# Patient Record
Sex: FEMALE | Race: WHITE | NOT HISPANIC OR LATINO | Employment: FULL TIME | URBAN - METROPOLITAN AREA
[De-identification: names, ages, dates, MRNs, and addresses within clinical notes are randomized per-mention and may not be internally consistent; named-entity substitution may affect disease eponyms.]

---

## 2019-02-20 NOTE — PROGRESS NOTES
SUBJECTIVE:   Laura Guajardo is a 38 year old female who presents to clinic today for the following health issues:      HPI     Patient is here with concerns of blood pressure. She was recently on a cruise and was sick and went to the medical center and blood pressure was 154/103.  When she came back from her cruise, she went to the [facility] clinic 5 days later and blood pressure was 152/94. She didn't think she was anxious at the time but she was still not feeling well.        Concern - Ear problem  Onset: 2/8/2019 saw the doctor on the ship. She was put on Augmentin for 5 days for left ear infection by the cruise doctor. She was put on another 5 days of Augmentin by the [facility].  [facility]    Description:   When she lays down, she can hear pulse/whooshing sound in left ear. She does not have pain in that ear. She was wondering if she has underlying allergies as she has had recurrent sinus pain, pressure and sinus infections. Prior to 1.5 years ago she had not problems with her sinuses. 1.5 years ago she was on vacation in [facility] and there was a hotel that was old being torn down near where they spent most of their time. They were directing the exhaust from the tear-down toward the area where they were spending most of their time. Since then she has had sinus problems and is wondering if that was part of the cause. This past fall/winter she had sinusitis November, January and February.       Intensity: mild    Progression of Symptoms:  improving    Accompanying Signs & Symptoms:  None    Previous history of similar problem:   None    Precipitating factors:   Worsened by:     Alleviating factors:  Improved by:     Therapies Tried and outcome:     Weight - she does not feel motivated to lose weight because she has so much going on right now with her kids, work and home life. She has lost weight in the past only to gain it back which frustrates her. She feels fine overall and her size is not  bothersome other than she knows she should lose weight.    Problem list and histories reviewed & adjusted, as indicated.  Additional history: as documented    Patient Active Problem List   Diagnosis     CARDIOVASCULAR SCREENING; LDL GOAL LESS THAN 160     Morbid obesity (H)     Past Surgical History:   Procedure Laterality Date     TUBAL LIGATION  2006       Social History     Tobacco Use     Smoking status: Former Smoker     Packs/day: 0.80     Types: Cigarettes     Smokeless tobacco: Never Used   Substance Use Topics     Alcohol use: Yes     Comment: occationally     Family History   Problem Relation Age of Onset     Cancer Maternal Aunt      Hypertension Father      Alcohol/Drug Father         alcohol     Heart Disease Father 52        MI     Psychotic Disorder Sister         depression,anxiety     Psychotic Disorder Sister         depression, anxiety     Alcohol/Drug Brother         alcohol and drugs     Psychotic Disorder Brother         depression     Cancer - colorectal Paternal Grandmother      Cancer - colorectal Paternal Grandfather          Current Outpatient Medications   Medication Sig Dispense Refill     fluticasone (FLONASE) 50 MCG/ACT nasal spray SPRAY 2 SPRAYS INTO BOTH NOSTRILS ONCE DAILY  0     ibuprofen (ADVIL,MOTRIN) 200 MG tablet Take 200 mg by mouth every 4 hours as needed.       ranitidine (ZANTAC) 150 MG tablet Take 1 tablet (150 mg) by mouth 2 times daily (Patient not taking: Reported on 2/25/2019) 60 tablet 1     No Known Allergies  BP Readings from Last 3 Encounters:   02/25/19 130/88   02/26/16 122/80   11/10/14 112/74    Wt Readings from Last 3 Encounters:   02/25/19 117.7 kg (259 lb 6.4 oz)   02/26/16 97.9 kg (215 lb 14.4 oz)   11/10/14 88.7 kg (195 lb 8 oz)                  Labs reviewed in EPIC    ROS:  Constitutional, HEENT, cardiovascular, pulmonary, gi and gu systems are negative, except as otherwise noted.    OBJECTIVE:     /88   Pulse 84   Temp 97  F (36.1  C) (Temporal)  "  Resp 16   Ht 1.636 m (5' 4.41\")   Wt 117.7 kg (259 lb 6.4 oz)   BMI 43.96 kg/m    Body mass index is 43.96 kg/m .  GENERAL: obese, alert and no acute distress  EYES: Eyes grossly normal to inspection, PERRL and conjunctivae and sclerae normal  HENT: no tenderness to palpation over maxillary sinuses, ear canals and left TM with clear effusion, nose and mouth without ulcers or lesions  NECK: no adenopathy, no asymmetry, masses, or scars and thyroid normal to palpation  RESP: lungs clear to auscultation - no rales, rhonchi or wheezes  CV: regular rate and rhythm, normal S1 S2, no S3 or S4, no murmur, click or rub  MS: no gross musculoskeletal defects noted, no edema  SKIN: no suspicious lesions or rashes  NEURO: Normal strength and tone, mentation intact and speech normal  PSYCH: mentation appears normal, affect normal/bright    Diagnostic Test Results:  none     ASSESSMENT/PLAN:     1. Acute recurrent maxillary sinusitis  Symptoms started after vacation in [facility] with exposure to air pollution from demolition of an old hotel which was being vented towards the area where they spent most of their time. Patient was thinking about taking allergy medication but I recommended starting with an ENT consult for further evaluation first. She is agreeable to this.   - OTOLARYNGOLOGY REFERRAL    2. Morbid obesity (H)  Discussed need for weight loss. Patient lacks motivation in this area as she has other things that are taking priority. We discussed the effect of weight on overall health and blood pressure over time and recommended making some changes to lifestyle to bring weight down. Patient agrees and will work on this over time as other stressors are settled.     3. Elevated blood pressure reading without diagnosis of hypertension  Normal BP here in clinic. Discussed that elevation in BP could have been due to illness and anxiety she was having. I recommended she have it checked a couple times at the pharmacy over the " next month to ensure it is continuing to be in normal range and follow up if rechecks are elevated.     Greater than 50% of 25 minute visit were spent on counseling or coordination of care regarding recurrent maxillary sinusitis, obesity, elevated BP without diagnosis of hypertension.       KIMBERLY Onofre [facility]

## 2019-02-25 ENCOUNTER — OFFICE VISIT (OUTPATIENT)
Dept: FAMILY MEDICINE | Facility: OTHER | Age: 39
End: 2019-02-25
Payer: COMMERCIAL

## 2019-02-25 VITALS
TEMPERATURE: 97 F | HEART RATE: 84 BPM | RESPIRATION RATE: 16 BRPM | HEIGHT: 64 IN | BODY MASS INDEX: 44.28 KG/M2 | DIASTOLIC BLOOD PRESSURE: 88 MMHG | WEIGHT: 259.4 LBS | SYSTOLIC BLOOD PRESSURE: 130 MMHG

## 2019-02-25 DIAGNOSIS — E66.01 MORBID OBESITY (H): ICD-10-CM

## 2019-02-25 DIAGNOSIS — R03.0 ELEVATED BLOOD PRESSURE READING WITHOUT DIAGNOSIS OF HYPERTENSION: ICD-10-CM

## 2019-02-25 DIAGNOSIS — J01.01 ACUTE RECURRENT MAXILLARY SINUSITIS: Primary | ICD-10-CM

## 2019-02-25 PROCEDURE — 99214 OFFICE O/P EST MOD 30 MIN: CPT | Performed by: STUDENT IN AN ORGANIZED HEALTH CARE EDUCATION/TRAINING PROGRAM

## 2019-02-25 RX ORDER — FLUTICASONE PROPIONATE 50 MCG
SPRAY, SUSPENSION (ML) NASAL
Refills: 0 | COMMUNITY
Start: 2019-02-13 | End: 2019-07-10

## 2019-02-25 ASSESSMENT — MIFFLIN-ST. JEOR: SCORE: 1848.12

## 2019-02-25 ASSESSMENT — PATIENT HEALTH QUESTIONNAIRE - PHQ9: SUM OF ALL RESPONSES TO PHQ QUESTIONS 1-9: 8

## 2019-02-25 ASSESSMENT — PAIN SCALES - GENERAL: PAINLEVEL: NO PAIN (0)

## 2019-02-25 NOTE — PATIENT INSTRUCTIONS
Pseudoephedrine twice daily for 3 days to dry up inner ear fluid.    Ear and nose and throat doctor referral. Call to schedule an appointment with Dr. Pringle.    Stop by the pharmacy to have your blood pressure checked 1-2 times in the next month.    HUBERT BerryC

## 2019-03-27 ENCOUNTER — TELEPHONE (OUTPATIENT)
Dept: FAMILY MEDICINE | Facility: OTHER | Age: 39
End: 2019-03-27

## 2019-03-27 NOTE — TELEPHONE ENCOUNTER
You placed a referral for patient to ENT on 2/25/19.  Patient has not scheduled as of yet.      Please review and forward to team if follow up with the patient is needed.     Thank you!  Michelle/Clinic Referrals Dyad II

## 2019-07-10 ENCOUNTER — NURSE TRIAGE (OUTPATIENT)
Dept: FAMILY MEDICINE | Facility: OTHER | Age: 39
End: 2019-07-10

## 2019-07-10 ENCOUNTER — TELEPHONE (OUTPATIENT)
Dept: FAMILY MEDICINE | Facility: OTHER | Age: 39
End: 2019-07-10

## 2019-07-10 ENCOUNTER — OFFICE VISIT (OUTPATIENT)
Dept: FAMILY MEDICINE | Facility: CLINIC | Age: 39
End: 2019-07-10
Payer: COMMERCIAL

## 2019-07-10 VITALS
SYSTOLIC BLOOD PRESSURE: 122 MMHG | WEIGHT: 238 LBS | TEMPERATURE: 98.3 F | DIASTOLIC BLOOD PRESSURE: 92 MMHG | HEIGHT: 64 IN | HEART RATE: 80 BPM | RESPIRATION RATE: 18 BRPM | OXYGEN SATURATION: 98 % | BODY MASS INDEX: 40.63 KG/M2

## 2019-07-10 DIAGNOSIS — E66.01 MORBID OBESITY (H): ICD-10-CM

## 2019-07-10 DIAGNOSIS — R42 DIZZINESS: Primary | ICD-10-CM

## 2019-07-10 PROCEDURE — 99214 OFFICE O/P EST MOD 30 MIN: CPT | Performed by: NURSE PRACTITIONER

## 2019-07-10 PROCEDURE — 36415 COLL VENOUS BLD VENIPUNCTURE: CPT | Performed by: NURSE PRACTITIONER

## 2019-07-10 PROCEDURE — 80053 COMPREHEN METABOLIC PANEL: CPT | Performed by: NURSE PRACTITIONER

## 2019-07-10 RX ORDER — MECLIZINE HYDROCHLORIDE 25 MG/1
25 TABLET ORAL 3 TIMES DAILY PRN
Qty: 30 TABLET | Refills: 0 | Status: SHIPPED | OUTPATIENT
Start: 2019-07-10 | End: 2020-08-25

## 2019-07-10 ASSESSMENT — MIFFLIN-ST. JEOR: SCORE: 1751.07

## 2019-07-10 ASSESSMENT — PAIN SCALES - GENERAL: PAINLEVEL: NO PAIN (0)

## 2019-07-10 NOTE — TELEPHONE ENCOUNTER
Laura Guajardo is a 38 year old female who calls with dizziness    NURSING ASSESSMENT:  Description:  I spoke with the pt who states on Saturday she was on a boat all day. Sunday she started to feel dizzy. It is worse when she is sitting straight up or walking.   Onset/duration:  Sunday  Precip. factors:  none  Associated symptoms:  No recent illnesses, no fever, no ear pain  Improves/worsens symptoms:  Eating and drinking fine, pushing fluids.    Allergies: No Known Allergies    RECOMMENDED DISPOSITION:  Pt will go to  or [facility], offered appts for tomorrow morning  Will comply with recommendation: Yes  If further questions/concerns or if symptoms do not improve, worsen or new symptoms develop, call your PCP or [facility] as soon as possible.      Guideline used: dizziness  Telephone Triage Protocols for Nurses, Fifth Edition, Sofia Bledsoe RN

## 2019-07-10 NOTE — PROGRESS NOTES
Subjective     Laura Guajardo is a 38 year old female who presents to clinic today for the following health issues:    HPI   Dizziness  Onset: 4 days     Description: Patient was on a boat all day last Saturday. Dizziness started a day after.   Do you feel faint:  no   Does it feel like the surroundings (bed, room) are moving: no   Unsteady/off balance: no   Have you passed out or fallen: no     Intensity: moderate    Progression of Symptoms:  worsening    Accompanying Signs & Symptoms:  Heart palpitations: no   Nausea, vomiting: no   Weakness in arms or legs: no   Fatigue: YES  Vision or speech changes: no   Ringing in ears (Tinnitus): no   Hearing Loss: no     History:   Head trauma/concussion hx: no   Previous similar symptoms: no   Recent bleeding history: no     Precipitating factors:   Worse with activity or head movement: no   Any new medications (BP?): no   Alcohol/drug abuse/withdrawal: no     Alleviating factors:   Does staying in a fixed position give relief:  YES    Therapies Tried and outcome: none           Patient Active Problem List   Diagnosis     CARDIOVASCULAR SCREENING; LDL GOAL LESS THAN 160     Morbid obesity (H)     Past Surgical History:   Procedure Laterality Date     TUBAL LIGATION  2006       Social History     Tobacco Use     Smoking status: Former Smoker     Packs/day: 0.50     Years: 5.00     Pack years: 2.50     Types: Cigarettes     Smokeless tobacco: Never Used   Substance Use Topics     Alcohol use: Yes     Comment: 0-2 per week      Family History   Problem Relation Age of Onset     Cancer Maternal Aunt      Hypertension Father      Alcohol/Drug Father         alcohol     Heart Disease Father 52        MI     Psychotic Disorder Sister         depression,anxiety     Psychotic Disorder Sister         depression, anxiety     Alcohol/Drug Brother         alcohol and drugs     Psychotic Disorder Brother         depression     Cancer - colorectal Paternal Grandmother      Cancer -  "colorectal Paternal Grandfather          Current Outpatient Medications   Medication Sig Dispense Refill     meclizine (ANTIVERT) 25 MG tablet Take 1 tablet (25 mg) by mouth 3 times daily as needed for dizziness 30 tablet 0     ibuprofen (ADVIL,MOTRIN) 200 MG tablet Take 200 mg by mouth every 4 hours as needed.       No Known Allergies  Recent Labs   Lab Test 02/26/16  1539 11/08/13  0922   LDL  --  99   HDL  --  50   TRIG  --  90   ALT 44  --    CR 0.83  --    GFRESTIMATED 78  --    GFRESTBLACK >90   GFR Calc    --    POTASSIUM 3.9  --       BP Readings from Last 3 Encounters:   07/10/19 (!) 122/92   02/25/19 130/88   02/26/16 122/80    Wt Readings from Last 3 Encounters:   07/10/19 108 kg (238 lb)   02/25/19 117.7 kg (259 lb 6.4 oz)   02/26/16 97.9 kg (215 lb 14.4 oz)                    Reviewed and updated as needed this visit by Provider         Review of Systems   ROS COMP: Constitutional, HEENT, cardiovascular, pulmonary, gi and gu systems are negative, except as otherwise noted.      Objective    BP (!) 122/92   Pulse 80   Temp 98.3  F (36.8  C) (Temporal)   Resp 18   Ht 1.636 m (5' 4.41\")   Wt 108 kg (238 lb)   LMP 06/30/2019 (Approximate)   SpO2 98%   Breastfeeding? No   BMI 40.33 kg/m    Body mass index is 40.33 kg/m .  Physical Exam   GENERAL: healthy, alert and no distress  NECK: no adenopathy, no asymmetry, masses, or scars and thyroid normal to palpation  RESP: lungs clear to auscultation - no rales, rhonchi or wheezes  CV: regular rate and rhythm, normal S1 S2, no S3 or S4, no murmur, click or rub, no peripheral edema and peripheral pulses strong  ABDOMEN: soft, nontender, no hepatosplenomegaly, no masses and bowel sounds normal  MS: no gross musculoskeletal defects noted, no edema  SKIN: no suspicious lesions or rashes  NEURO: Normal strength and tone, mentation intact and speech normal  PSYCH: mentation appears normal, affect normal/bright    Diagnostic Test Results:  Labs " "reviewed in Epic  No results found for this or any previous visit (from the past 24 hour(s)).        Assessment & Plan     1. Dizziness  Suspect dizziness is related to possible dehydration or motion sickness.  [place]  Said she doesn't think she drank enough water, had a few alcoholic beverages.  Will check a CMP to ensure electrolytes are not abnormal and treat dizziness symptoms with meclizine.  We discussed with her elevated BP reading in clinic today to monitor as outpatient and follow-up in clinic in 2 weeks.  BP log and instructions to obtain an accurate measurement provided. Counseled for her to go to the ER if dizziness worsens, she develops CP, SOB or any other acute distress.    - Comprehensive metabolic panel  - meclizine (ANTIVERT) 25 MG tablet; Take 1 tablet (25 mg) by mouth 3 times daily as needed for dizziness  Dispense: 30 tablet; Refill: 0    2. Morbid obesity (H)  Patient needs long-term weight loss. She acknowledges she is working on trying to lose weight.  See BMI Plan below.       BMI:   Estimated body mass index is 40.33 kg/m  as calculated from the following:    Height as of this encounter: 1.636 m (5' 4.41\").    Weight as of this encounter: 108 kg (238 lb).   Weight management plan: Discussed healthy diet and exercise guidelines        Patient Instructions   CMP today - checking electrolytes  Check BP - record on log - along with any symptoms you may be having.  Follow-up with me in 2 weeks with your BP readings.  Take meclizine as needed.  Ensure you are drinking plenty of water.  Mix gatorade/half water.     Please call or return to clinic for any questions, concerns, or symptoms that are not improving or becoming worse.        Patient Education     Step-by-Step  Checking Your Blood Pressure    Date Last Reviewed: 4/27/2016 2000-2018 [employer]. [address], [place] [address]. All rights reserved. This information " is not intended as a substitute for professional medical care. Always follow your healthcare professional's instructions.               Return in about 2 weeks (around 7/24/2019) for BP Recheck.    Monica Mckoy, [facility]

## 2019-07-10 NOTE — PATIENT INSTRUCTIONS
CMP today - checking electrolytes  Check BP - record on log - along with any symptoms you may be having.  Follow-up with me in 2 weeks with your BP readings.  Take meclizine as needed.  Ensure you are drinking plenty of water.  Mix gatorade/half water.     Please call or return to clinic for any questions, concerns, or symptoms that are not improving or becoming worse.        Patient Education     Step-by-Step  Checking Your Blood Pressure    Date Last Reviewed: 4/27/2016 2000-2018 [employer]. [address] [place] [address], [place] [address]. All rights reserved. This information is not intended as a substitute for professional medical care. Always follow your healthcare professional's instructions.

## 2019-07-11 LAB
ALBUMIN SERPL-MCNC: 4 G/DL (ref 3.4–5)
ALP SERPL-CCNC: 41 U/L (ref 40–150)
ALT SERPL W P-5'-P-CCNC: 57 U/L (ref 0–50)
ANION GAP SERPL CALCULATED.3IONS-SCNC: 7 MMOL/L (ref 3–14)
AST SERPL W P-5'-P-CCNC: 33 U/L (ref 0–45)
BILIRUB SERPL-MCNC: 0.4 MG/DL (ref 0.2–1.3)
BUN SERPL-MCNC: 10 MG/DL (ref 7–30)
CALCIUM SERPL-MCNC: 8.9 MG/DL (ref 8.5–10.1)
CHLORIDE SERPL-SCNC: 106 MMOL/L (ref 94–109)
CO2 SERPL-SCNC: 25 MMOL/L (ref 20–32)
CREAT SERPL-MCNC: 0.79 MG/DL (ref 0.52–1.04)
GFR SERPL CREATININE-BSD FRML MDRD: >90 ML/MIN/{1.73_M2}
GLUCOSE SERPL-MCNC: 93 MG/DL (ref 70–99)
POTASSIUM SERPL-SCNC: 4.1 MMOL/L (ref 3.4–5.3)
PROT SERPL-MCNC: 7.3 G/DL (ref 6.8–8.8)
SODIUM SERPL-SCNC: 138 MMOL/L (ref 133–144)

## 2019-07-31 ENCOUNTER — HOSPITAL ENCOUNTER (EMERGENCY)
Facility: CLINIC | Age: 39
Discharge: HOME | End: 2019-08-01
Attending: PHYSICIAN ASSISTANT | Admitting: PHYSICIAN ASSISTANT
Payer: COMMERCIAL

## 2019-07-31 ENCOUNTER — NURSE TRIAGE (OUTPATIENT)
Dept: NURSING | Facility: CLINIC | Age: 39
End: 2019-07-31

## 2019-07-31 VITALS
OXYGEN SATURATION: 99 % | TEMPERATURE: 97.1 F | SYSTOLIC BLOOD PRESSURE: 178 MMHG | DIASTOLIC BLOOD PRESSURE: 91 MMHG | HEART RATE: 72 BPM | RESPIRATION RATE: 20 BRPM

## 2019-07-31 DIAGNOSIS — R25.1 SHAKINESS: ICD-10-CM

## 2019-07-31 LAB
ALBUMIN SERPL-MCNC: 3.9 G/DL (ref 3.4–5)
ALP SERPL-CCNC: 43 U/L (ref 40–150)
ALT SERPL W P-5'-P-CCNC: 42 U/L (ref 0–50)
ANION GAP SERPL CALCULATED.3IONS-SCNC: 8 MMOL/L (ref 3–14)
AST SERPL W P-5'-P-CCNC: 22 U/L (ref 0–45)
BASOPHILS # BLD AUTO: 0 10E9/L (ref 0–0.2)
BASOPHILS NFR BLD AUTO: 0.3 %
BILIRUB SERPL-MCNC: 0.3 MG/DL (ref 0.2–1.3)
BUN SERPL-MCNC: 9 MG/DL (ref 7–30)
CALCIUM SERPL-MCNC: 8.8 MG/DL (ref 8.5–10.1)
CHLORIDE SERPL-SCNC: 106 MMOL/L (ref 94–109)
CO2 SERPL-SCNC: 26 MMOL/L (ref 20–32)
CREAT SERPL-MCNC: 0.89 MG/DL (ref 0.52–1.04)
DIFFERENTIAL METHOD BLD: NORMAL
EOSINOPHIL NFR BLD AUTO: 0.4 %
ERYTHROCYTE [DISTWIDTH] IN BLOOD BY AUTOMATED COUNT: 13 % (ref 10–15)
GFR SERPL CREATININE-BSD FRML MDRD: 82 ML/MIN/{1.73_M2}
GLUCOSE SERPL-MCNC: 130 MG/DL (ref 70–99)
HCT VFR BLD AUTO: 40.9 % (ref 35–47)
HGB BLD-MCNC: 13.8 G/DL (ref 11.7–15.7)
IMM GRANULOCYTES # BLD: 0 10E9/L (ref 0–0.4)
IMM GRANULOCYTES NFR BLD: 0.3 %
LYMPHOCYTES # BLD AUTO: 1.8 10E9/L (ref 0.8–5.3)
LYMPHOCYTES NFR BLD AUTO: 25.7 %
MCH RBC QN AUTO: 30.7 PG (ref 26.5–33)
MCHC RBC AUTO-ENTMCNC: 33.7 G/DL (ref 31.5–36.5)
MCV RBC AUTO: 91 FL (ref 78–100)
MONOCYTES # BLD AUTO: 0.4 10E9/L (ref 0–1.3)
MONOCYTES NFR BLD AUTO: 5.6 %
NEUTROPHILS # BLD AUTO: 4.8 10E9/L (ref 1.6–8.3)
NEUTROPHILS NFR BLD AUTO: 67.7 %
NRBC # BLD AUTO: 0 10*3/UL
NRBC BLD AUTO-RTO: 0 /100
PLATELET # BLD AUTO: 317 10E9/L (ref 150–450)
POTASSIUM SERPL-SCNC: 3.7 MMOL/L (ref 3.4–5.3)
PROT SERPL-MCNC: 7.4 G/DL (ref 6.8–8.8)
RBC # BLD AUTO: 4.5 10E12/L (ref 3.8–5.2)
SODIUM SERPL-SCNC: 140 MMOL/L (ref 133–144)
TROPONIN I SERPL-MCNC: <0.015 UG/L (ref 0–0.04)
TSH SERPL DL<=0.005 MIU/L-ACNC: 2.1 MU/L (ref 0.4–4)
WBC # BLD AUTO: 7 10E9/L (ref 4–11)

## 2019-07-31 PROCEDURE — 85025 COMPLETE CBC W/AUTO DIFF WBC: CPT | Performed by: PHYSICIAN ASSISTANT

## 2019-07-31 PROCEDURE — 93010 ELECTROCARDIOGRAM REPORT: CPT | Mod: Z6 | Performed by: PHYSICIAN ASSISTANT

## 2019-07-31 PROCEDURE — 84484 ASSAY OF TROPONIN QUANT: CPT | Performed by: PHYSICIAN ASSISTANT

## 2019-07-31 PROCEDURE — 93005 ELECTROCARDIOGRAM TRACING: CPT

## 2019-07-31 PROCEDURE — 99284 EMERGENCY DEPT VISIT MOD MDM: CPT | Mod: 25 | Performed by: PHYSICIAN ASSISTANT

## 2019-07-31 PROCEDURE — 99284 EMERGENCY DEPT VISIT MOD MDM: CPT | Mod: 25

## 2019-07-31 PROCEDURE — 80053 COMPREHEN METABOLIC PANEL: CPT | Performed by: PHYSICIAN ASSISTANT

## 2019-07-31 PROCEDURE — 84443 ASSAY THYROID STIM HORMONE: CPT | Performed by: PHYSICIAN ASSISTANT

## 2019-07-31 NOTE — ED AVS SNAPSHOT
[facility] Emergency Department  [address] [place] [address]  Phone:  [phone]  Fax:  [phone]                                    Laura Guajardo   MRN: [id number]    Department:  [facility] Emergency Department   Date of Visit:  7/31/2019           After Visit Summary Signature Page    I have received my discharge instructions, and my questions have been answered. I have discussed any challenges I see with this plan with the nurse or doctor.    ..........................................................................................................................................  Patient/Patient Representative Signature      ..........................................................................................................................................  Patient Representative Print Name and Relationship to Patient    ..................................................               ................................................  Date                                   Time    ..........................................................................................................................................  Reviewed by Signature/Title    ...................................................              ..............................................  Date                                               Time          22EPIC Rev 08/18

## 2019-08-01 NOTE — TELEPHONE ENCOUNTER
This evening patient had a sudden sensation of chest tightness and back discomfort, and dry mouth. Is still feeling anxious and her blood pressure is higher than usual at 141/98.  Has never felt this before. I advised 911, but she is thinking of having her  drive her the the ED.     Ling De La Torre RN, [facility]       Reason for Disposition    Patient sounds very upset or troubled to the triager    [1] Chest pain lasts > 5 minutes AND [2] age > 30 AND [3] at least one cardiac risk factor (i.e., hypertension, diabetes, obesity, smoker or strong family history of heart disease)    Additional Information    Negative: Severe difficulty breathing (e.g., struggling for each breath, speaks in single words)    Negative: Bluish (or gray) lips or face now    Negative: Difficult to awaken or acting confused (e.g., disoriented, slurred speech)    Negative: Hysterical or combative behavior    Negative: Sounds like a life-threatening emergency to the triager    Negative: Chest pain    Negative: Palpitations, skipped heart beat, or rapid heart beat    Negative: Cough is main symptom    Negative: Suicide thoughts, threats, attempts, or questions    Negative: Depression is main problem or symptom (e.g., feelings of sadness or hopelessness)    Negative: [1] Difficulty breathing AND [2] persists > 10 minutes AND [3] not relieved by reassurance provided by triager    Negative: [1] Lightheadedness or dizziness AND [2] persists > 10 minutes AND [3] not relieved by reassurance provided by triager    Negative: [1] Known or suspected alcohol or drug abuse AND [2] feeling very shaky (i.e., visible tremors of hands)    Negative: Patient sounds very sick or weak to the triager    Negative: Symptoms interfere with work or school    Negative: Requesting to talk to a counselor (e.g., mental health worker, psychiatrist)    Negative: Severe difficulty breathing (e.g., struggling for each breath, speaks in single words)    Negative:  Difficult to awaken or acting confused (e.g., disoriented, slurred speech)    Negative: Shock suspected (e.g., cold/pale/clammy skin, too weak to stand, low BP, rapid pulse)    Negative: [1] Chest pain lasts > 5 minutes AND [2] history of heart disease  (i.e., heart attack, bypass surgery, angina, angioplasty, CHF; not just a heart murmur)    Negative: [1] Chest pain lasts > 5 minutes AND [2] described as crushing, pressure-like, or heavy    Negative: [1] Chest pain lasts > 5 minutes AND [2] age > 50    Protocols used: ANXIETY AND PANIC ATTACK-A-AH, CHEST PAIN-A-AH

## 2019-08-01 NOTE — ED TRIAGE NOTES
Pt tacking joshua vision otc for weight loss. Pt thinks it may be causing panic attacks. Been on it since April. Takes it bid.

## 2019-08-01 NOTE — ED NOTES
Pt presents in a panic mode. Quite anxious, breathing quickly. Pt thinks her otc medication may be causing a panic attack. Hx of anxiety,

## 2019-08-01 NOTE — ED PROVIDER NOTES
"  History     Chief Complaint   Patient presents with     Panic Attack     HPI  Laura Guajardo is a 38 year old female who presents for evaluation of symptoms starting this evening where she was standing at a friend's house and suddenly \"felt an adrenaline feeling throughout my body \".  She states that she developed some tightness in her chest which she would not really report as a pain but more as a \"pressure \".  It was better if she pushed on her chest.  She felt very shaky and her mouth got instantly dry.  She felt an overwhelming sense of anxiety.  She states, \"it was an odd feeling through my whole body \".  She denies any significant chest pain at this time.  She had a similar sensation go through her body 3 days ago as well.  She reports taking a TruVision weight loss supplement for the past 3 months which has helped her lose over 30 pounds.  She continues to eat well.  Does not take any stimulants.  Denies any street drug use.  No other over-the-counter products.  She denies any current palpitations, dyspnea, abdominal pain, lower extremity edema, lower extremity pain, fevers, chills, nausea, vomiting, or diaphoresis.  She is concerned given her father having an MI at 52 years old and maternal grandfather with an MI.  She is a non-smoker.  Nondiabetic.  No hyperlipidemia or hypertension.        Allergies:  No Known Allergies    Problem List:    Patient Active Problem List    Diagnosis Date Noted     Morbid obesity (H) 02/25/2019     Priority: Medium     CARDIOVASCULAR SCREENING; LDL GOAL LESS THAN 160 11/10/2014     Priority: Medium        Past Medical History:    No past medical history on file.    Past Surgical History:    Past Surgical History:   Procedure Laterality Date     TUBAL LIGATION  2006       Family History:    Family History   Problem Relation Age of Onset     Cancer Maternal Aunt      Hypertension Father      Alcohol/Drug Father         alcohol     Heart Disease Father 52        MI     " Psychotic Disorder Sister         depression,anxiety     Psychotic Disorder Sister         depression, anxiety     Alcohol/Drug Brother         alcohol and drugs     Psychotic Disorder Brother         depression     Cancer - colorectal Paternal Grandmother      Cancer - colorectal Paternal Grandfather        Social History:  Marital Status:   [2]  Social History     Tobacco Use     Smoking status: Former Smoker     Packs/day: 0.50     Years: 5.00     Pack years: 2.50     Types: Cigarettes     Smokeless tobacco: Never Used   Substance Use Topics     Alcohol use: Yes     Comment: 0-2 per week      Drug use: No        Medications:      ibuprofen (ADVIL,MOTRIN) 200 MG tablet   meclizine (ANTIVERT) 25 MG tablet         Review of Systems   All other systems reviewed and are negative.      Physical Exam   BP: (!) 178/91  Pulse: 72  Temp: 97.1  F (36.2  C)  Resp: 20  SpO2: 99 %      Physical Exam   Constitutional: She is oriented to person, place, and time. No distress.   HENT:   Head: Normocephalic and atraumatic.   Right Ear: External ear normal.   Left Ear: External ear normal.   Nose: Nose normal.   Mouth/Throat: Oropharynx is clear and moist. No oropharyngeal exudate.   Eyes: Pupils are equal, round, and reactive to light. Conjunctivae and EOM are normal. Right eye exhibits no discharge. Left eye exhibits no discharge. No scleral icterus.   Neck: Normal range of motion. Neck supple. No thyromegaly present.   Cardiovascular: Normal rate, regular rhythm and normal heart sounds.   No murmur heard.  Pulmonary/Chest: Effort normal and breath sounds normal. No respiratory distress. She has no wheezes. She has no rales. She exhibits no tenderness.   Abdominal: Soft. Bowel sounds are normal. She exhibits no distension and no mass. There is no tenderness. There is no rebound and no guarding.   Musculoskeletal: Normal range of motion. She exhibits no edema, tenderness or deformity.   Lymphadenopathy:     She has no  cervical adenopathy.   Neurological: She is alert and oriented to person, place, and time. She displays normal reflexes. No cranial nerve deficit or sensory deficit. She exhibits normal muscle tone. Coordination normal.   Skin: Skin is warm and dry. Capillary refill takes less than 2 seconds. No rash noted. She is not diaphoretic. No erythema.   Psychiatric: She has a normal mood and affect. Her behavior is normal. Thought content normal.   Nursing note and vitals reviewed.      ED Course        Procedures               EKG Interpretation:      Interpreted by Lincoln Copeland  Time reviewed: immediately after it was done.  Symptoms at time of EKG: none.   Rhythm: normal sinus   Rate: normal  Axis: normal  Ectopy: none  Conduction: normal  ST Segments/ T Waves: No ST-T wave changes  Q Waves: none  Comparison to prior: No old EKG available    Clinical Impression: normal EKG          Critical Care time:  none               Results for orders placed or performed during the hospital encounter of 07/31/19 (from the past 24 hour(s))   CBC with platelets differential   Result Value Ref Range    WBC 7.0 4.0 - 11.0 10e9/L    RBC Count 4.50 3.8 - 5.2 10e12/L    Hemoglobin 13.8 11.7 - 15.7 g/dL    Hematocrit 40.9 35.0 - 47.0 %    MCV 91 78 - 100 fl    MCH 30.7 26.5 - 33.0 pg    MCHC 33.7 31.5 - 36.5 g/dL    RDW 13.0 10.0 - 15.0 %    Platelet Count 317 150 - 450 10e9/L    Diff Method Automated Method     % Neutrophils 67.7 %    % Lymphocytes 25.7 %    % Monocytes 5.6 %    % Eosinophils 0.4 %    % Basophils 0.3 %    % Immature Granulocytes 0.3 %    Nucleated RBCs 0 0 /100    Absolute Neutrophil 4.8 1.6 - 8.3 10e9/L    Absolute Lymphocytes 1.8 0.8 - 5.3 10e9/L    Absolute Monocytes 0.4 0.0 - 1.3 10e9/L    Absolute Basophils 0.0 0.0 - 0.2 10e9/L    Abs Immature Granulocytes 0.0 0 - 0.4 10e9/L    Absolute Nucleated RBC 0.0    Comprehensive metabolic panel   Result Value Ref Range    Sodium 140 133 - 144 mmol/L    Potassium 3.7  "3.4 - 5.3 mmol/L    Chloride 106 94 - 109 mmol/L    Carbon Dioxide 26 20 - 32 mmol/L    Anion Gap 8 3 - 14 mmol/L    Glucose 130 (H) 70 - 99 mg/dL    Urea Nitrogen 9 7 - 30 mg/dL    Creatinine 0.89 0.52 - 1.04 mg/dL    GFR Estimate 82 >60 mL/min/[1.73_m2]    GFR Estimate If Black >90 >60 mL/min/[1.73_m2]    Calcium 8.8 8.5 - 10.1 mg/dL    Bilirubin Total 0.3 0.2 - 1.3 mg/dL    Albumin 3.9 3.4 - 5.0 g/dL    Protein Total 7.4 6.8 - 8.8 g/dL    Alkaline Phosphatase 43 40 - 150 U/L    ALT 42 0 - 50 U/L    AST 22 0 - 45 U/L   Troponin I   Result Value Ref Range    Troponin I ES <0.015 0.000 - 0.045 ug/L   TSH with free T4 reflex   Result Value Ref Range    TSH 2.10 0.40 - 4.00 mU/L       Medications - No data to display    Assessments & Plan (with Medical Decision Making)  Shakiness     38 year old female presents for evaluation of shakiness, tightness in her chest, dry mouth, and which she describes as \"an adrenaline feeling throughout \".  Acute on symptoms this evening.  She felt very anxious.  She wonders if it is related to a dietary supplement that she has been taking called [employer].  On exam blood pressure 178/91, pulse 72, temperature 97.1, and oxygen saturation 99% on room air.  Patient appears mildly anxious.  Remainder the exam is completely normal as noted above.  I offered medication for anxiety, but she declined.  EKG performed without acute ST or T wave elevation.  No arrhythmia.  Laboratory levels display an undetectable troponin.  TSH normal at 2.10.  Comprehensive metabolic panel with a mild elevation in the glucose at 130, but this was a nonfasting level.  CBC completely normal.  Patient was reassured.  No abnormality on her work-up.  She does not have any persistent chest pain, and she is very low risk for any ischemic heart disease.  I am concerned that this is related to her dietary supplement which is full of stimulant type medications and herbs.  I did review the list that she had on her cell " phone for review.  I recommended that she stop this medication for the next 1 week and see how she does.  If symptoms persist despite stopping this medication, then she would need to see her PCP for further evaluation.  Indications for ED return prior to then discussed with her.  I once again offered her something for anxiety, but she declined.  She felt that she would do fine by going home and going to bed.  Patient was in agreement with this plan and she was suitable for discharge.  Her  was present to drive her home.     I have reviewed the nursing notes.    I have reviewed the findings, diagnosis, plan and need for follow up with the patient.          Medication List      There are no discharge medications for this visit.         Final diagnoses:   Shakiness       Disclaimer: This note consists of symbols derived from keyboarding, dictation and/or voice recognition software. As a result, there may be errors in the script that have gone undetected. Please consider this when interpreting information found in this chart.      7/31/2019   Lincoln Copeland PA-C   [facility] EMERGENCY DEPARTMENT     Lincoln Copeland PA-C  08/01/19 0057

## 2019-11-08 ENCOUNTER — HEALTH MAINTENANCE LETTER (OUTPATIENT)
Age: 39
End: 2019-11-08

## 2019-12-18 ENCOUNTER — TELEPHONE (OUTPATIENT)
Dept: FAMILY MEDICINE | Facility: OTHER | Age: 39
End: 2019-12-18

## 2019-12-18 NOTE — TELEPHONE ENCOUNTER
Summary:    Patient is due/failing the following:   PAP and PHYSICAL    Action needed:   Patient needs office visit for Physical and PAP.    Type of outreach:    Phone, left message for patient to call back.     Questions for provider review:    None                                                                                                                                    Sarah Dupont CMA       Chart routed to Care Team .        Panel Management Review      Patient has the following on her problem list: None      Composite cancer screening  Chart review shows that this patient is due/due soon for the following Pap Smear

## 2020-02-23 ENCOUNTER — HEALTH MAINTENANCE LETTER (OUTPATIENT)
Age: 40
End: 2020-02-23

## 2020-07-28 ENCOUNTER — MYC MEDICAL ADVICE (OUTPATIENT)
Dept: FAMILY MEDICINE | Facility: OTHER | Age: 40
End: 2020-07-28

## 2020-07-28 ENCOUNTER — OFFICE VISIT (OUTPATIENT)
Dept: FAMILY MEDICINE | Facility: OTHER | Age: 40
End: 2020-07-28
Payer: COMMERCIAL

## 2020-07-28 VITALS
WEIGHT: 248 LBS | RESPIRATION RATE: 16 BRPM | TEMPERATURE: 99 F | HEART RATE: 89 BPM | BODY MASS INDEX: 41.32 KG/M2 | HEIGHT: 65 IN | OXYGEN SATURATION: 100 % | SYSTOLIC BLOOD PRESSURE: 136 MMHG | DIASTOLIC BLOOD PRESSURE: 90 MMHG

## 2020-07-28 DIAGNOSIS — F41.0 PANIC ATTACK: ICD-10-CM

## 2020-07-28 DIAGNOSIS — J30.1 SEASONAL ALLERGIC RHINITIS DUE TO POLLEN: ICD-10-CM

## 2020-07-28 DIAGNOSIS — F41.1 GAD (GENERALIZED ANXIETY DISORDER): Primary | ICD-10-CM

## 2020-07-28 DIAGNOSIS — R03.0 ELEVATED BLOOD PRESSURE READING WITHOUT DIAGNOSIS OF HYPERTENSION: ICD-10-CM

## 2020-07-28 PROCEDURE — 99214 OFFICE O/P EST MOD 30 MIN: CPT | Performed by: NURSE PRACTITIONER

## 2020-07-28 RX ORDER — CETIRIZINE HYDROCHLORIDE 10 MG/1
10 TABLET ORAL DAILY
Qty: 60 TABLET | Refills: 0 | Status: SHIPPED | OUTPATIENT
Start: 2020-07-28 | End: 2020-08-25

## 2020-07-28 RX ORDER — PROPRANOLOL HYDROCHLORIDE 20 MG/1
20 TABLET ORAL DAILY
Qty: 60 TABLET | Refills: 0 | Status: SHIPPED | OUTPATIENT
Start: 2020-07-28 | End: 2020-09-08

## 2020-07-28 RX ORDER — CITALOPRAM HYDROBROMIDE 10 MG/1
10 TABLET ORAL DAILY
Qty: 60 TABLET | Refills: 0 | Status: SHIPPED | OUTPATIENT
Start: 2020-07-28 | End: 2020-09-22

## 2020-07-28 ASSESSMENT — MIFFLIN-ST. JEOR: SCORE: 1799.54

## 2020-07-28 NOTE — PROGRESS NOTES
Subjective     Laura Guajardo is a 39 year old female who presents to clinic today for the following health issues:    HPI       Acute Illness   Acute illness concerns: Facial pressure  Onset: 4 days    Fever: no    Chills/Sweats: no    Headache (location?): no    Sinus Pressure:YES    Conjunctivitis:  no    Ear Pain: no    Rhinorrhea: no    Congestion: no    Sore Throat: no     Cough: no    Wheeze: no    Decreased Appetite: no    Nausea: no    Vomiting: no    Diarrhea:  no    Dysuria/Freq.: no    Fatigue/Achiness: YES- fatigue    Sick/Strep Exposure: no     Therapies Tried and outcome: Ibuprofen    Abnormal Mood Symptoms  Onset: For the last year    Description:   Depression: no  Anxiety: YES    Accompanying Signs & Symptoms:  Still participating in activities that you used to enjoy: YES  Fatigue: YES  Irritability: no  Difficulty concentrating: YES  Changes in appetite: no  Problems with sleep: YES  Heart racing/beating fast : YES  Thoughts of hurting yourself or others: none    History:   Recent stress: YES- work  Prior depression hospitalization: None  Family history of depression: YES  Family history of anxiety: YES    Precipitating factors:   Alcohol/drug use: YES- occasional alcohol    Alleviating factors:  Change focus and breathe    Therapies Tried and outcome: Prozac (Fluoxetine)    Patient with a history of post partum depression. She feels she is always feeling anxious, having symptoms of panic attacks with increased headache, flushing, heart rate increase, lasting about minutes to hours.     Had post-partum after having her son. Son is 14 years old now.  Example, Would feel anxious if she gets an email at work regarding payroll    She also having symptoms of sinus pressure and sore throat.           Patient Active Problem List   Diagnosis     CARDIOVASCULAR SCREENING; LDL GOAL LESS THAN 160     Morbid obesity (H)     Past Surgical History:   Procedure Laterality Date     TUBAL LIGATION  2006        Social History     Tobacco Use     Smoking status: Former Smoker     Packs/day: 0.50     Years: 5.00     Pack years: 2.50     Types: Cigarettes     Smokeless tobacco: Never Used   Substance Use Topics     Alcohol use: Yes     Comment: 0-2 per week      Family History   Problem Relation Age of Onset     Cancer Maternal Aunt      Hypertension Father      Alcohol/Drug Father         alcohol     Heart Disease Father 52        MI     Psychotic Disorder Sister         depression,anxiety     Psychotic Disorder Sister         depression, anxiety     Alcohol/Drug Brother         alcohol and drugs     Psychotic Disorder Brother         depression     Cancer - colorectal Paternal Grandmother      Cancer - colorectal Paternal Grandfather          Current Outpatient Medications   Medication Sig Dispense Refill     cetirizine (ZYRTEC) 10 MG tablet Take 1 tablet (10 mg) by mouth daily 60 tablet 0     citalopram (CELEXA) 10 MG tablet Take 1 tablet (10 mg) by mouth daily 60 tablet 0     ibuprofen (ADVIL,MOTRIN) 200 MG tablet Take 200 mg by mouth every 4 hours as needed.       propranolol (INDERAL) 20 MG tablet Take 1 tablet (20 mg) by mouth daily 60 tablet 0     meclizine (ANTIVERT) 25 MG tablet Take 1 tablet (25 mg) by mouth 3 times daily as needed for dizziness (Patient not taking: Reported on 7/28/2020) 30 tablet 0     No Known Allergies  Recent Labs   Lab Test 07/31/19  2305 07/10/19  1848 02/26/16  1539  11/08/13  0922   LDL  --   --   --   --  99   HDL  --   --   --   --  50   TRIG  --   --   --   --  90   ALT 42 57* 44  --   --    CR 0.89 0.79 0.83   < >  --    GFRESTIMATED 82 >90 78   < >  --    GFRESTBLACK >90 >90 >90  African American GFR Calc     < >  --    POTASSIUM 3.7 4.1 3.9   < >  --    TSH 2.10  --   --   --   --     < > = values in this interval not displayed.      BP Readings from Last 3 Encounters:   07/28/20 (!) 136/90   07/31/19 (!) 178/91   07/10/19 (!) 122/92    Wt Readings from Last 3 Encounters:  "  07/28/20 112.5 kg (248 lb)   07/10/19 108 kg (238 lb)   02/25/19 117.7 kg (259 lb 6.4 oz)        Reviewed and updated as needed this visit by Provider      Review of Systems   Constitutional, HEENT, cardiovascular, pulmonary, GI, , musculoskeletal, neuro, skin, endocrine and psych systems are negative, except as otherwise noted.      Objective    BP (!) 136/90   Pulse 89   Temp 99  F (37.2  C) (Temporal)   Resp 16   Ht 1.649 m (5' 4.92\")   Wt 112.5 kg (248 lb)   LMP 07/20/2020   SpO2 100%   BMI 41.37 kg/m    Body mass index is 41.37 kg/m .  Physical Exam   GENERAL: healthy, alert and no distress  EYES: Eyes grossly normal to inspection, PERRL and conjunctivae and sclerae normal  HENT: normal cephalic/atraumatic, left ear: normal: no effusions, no erythema, normal landmarks, nose and mouth without ulcers or lesions, nasal mucosa edematous , oropharynx clear and oral mucous membranes moist  NECK: no adenopathy, no asymmetry, masses, or scars and thyroid normal to palpation  RESP: lungs clear to auscultation - no rales, rhonchi or wheezes  CV: regular rate and rhythm, normal S1 S2, no S3 or S4, no murmur, click or rub, no peripheral edema and peripheral pulses strong  MS: no gross musculoskeletal defects noted, no edema  SKIN: no suspicious lesions or rashes  NEURO: Normal strength and tone, mentation intact and speech normal  BACK: no CVA tenderness, no paralumbar tenderness  PSYCH: mentation appears normal, affect normal/bright  LYMPH: no cervical, supraclavicular, axillary, or inguinal adenopathy    Diagnostic Test Results:  Labs reviewed in Epic        Assessment & Plan     1. BLAINE (generalized anxiety disorder)    - citalopram (CELEXA) 10 MG tablet; Take 1 tablet (10 mg) by mouth daily  Dispense: 60 tablet; Refill: 0  - propranolol (INDERAL) 20 MG tablet; Take 1 tablet (20 mg) by mouth daily  Dispense: 60 tablet; Refill: 0    2. Panic attack    - citalopram (CELEXA) 10 MG tablet; Take 1 tablet (10 mg) " "by mouth daily  Dispense: 60 tablet; Refill: 0  - propranolol (INDERAL) 20 MG tablet; Take 1 tablet (20 mg) by mouth daily  Dispense: 60 tablet; Refill: 0    3. Seasonal allergic rhinitis due to pollen    - cetirizine (ZYRTEC) 10 MG tablet; Take 1 tablet (10 mg) by mouth daily  Dispense: 60 tablet; Refill: 0    4. Elevated blood pressure reading without diagnosis of hypertension    - propranolol (INDERAL) 20 MG tablet; Take 1 tablet (20 mg) by mouth daily  Dispense: 60 tablet; Refill: 0       1-2 starting Celexa Side effects of medications, proper use, the associated risk/benefits and other options were discussed. Patient understands these risks and agrees to take the medication as instructed.  Will start propranolol for anxiety as well as blood pressure management. disucssed that this may cause decrease heart rate and B/P she will monitor closely   Will return to clinic in 6 weeks for mood and blood pressure follow up.   Home care instructions were reviewed with the patient. The risks, benefits and treatment options of prescribed medications or other treatments have been discussed with the patient. The patient verbalized their understanding and should call or follow up if no improvement or if they develop further problems.    BMI:   Estimated body mass index is 41.37 kg/m  as calculated from the following:    Height as of this encounter: 1.649 m (5' 4.92\").    Weight as of this encounter: 112.5 kg (248 lb).   Weight management plan: Patient was referred to their PCP to discuss a diet and exercise plan.    Patient Instructions   Please follow up in 6 weeks for mood.           Return in about 6 weeks (around 9/8/2020).    KIMBERLY [facility]"

## 2020-07-30 ENCOUNTER — MYC MEDICAL ADVICE (OUTPATIENT)
Dept: FAMILY MEDICINE | Facility: OTHER | Age: 40
End: 2020-07-30

## 2020-08-05 ENCOUNTER — OFFICE VISIT (OUTPATIENT)
Dept: FAMILY MEDICINE | Facility: OTHER | Age: 40
End: 2020-08-05
Payer: COMMERCIAL

## 2020-08-05 VITALS
RESPIRATION RATE: 16 BRPM | TEMPERATURE: 98.4 F | HEART RATE: 71 BPM | OXYGEN SATURATION: 98 % | WEIGHT: 249.25 LBS | HEIGHT: 65 IN | SYSTOLIC BLOOD PRESSURE: 132 MMHG | BODY MASS INDEX: 41.53 KG/M2 | DIASTOLIC BLOOD PRESSURE: 80 MMHG

## 2020-08-05 DIAGNOSIS — Z11.4 SCREENING FOR HIV (HUMAN IMMUNODEFICIENCY VIRUS): ICD-10-CM

## 2020-08-05 DIAGNOSIS — Z12.4 SCREENING FOR MALIGNANT NEOPLASM OF CERVIX: ICD-10-CM

## 2020-08-05 DIAGNOSIS — Z00.00 ROUTINE GENERAL MEDICAL EXAMINATION AT A HEALTH CARE FACILITY: ICD-10-CM

## 2020-08-05 PROCEDURE — G0145 SCR C/V CYTO,THINLAYER,RESCR: HCPCS | Performed by: NURSE PRACTITIONER

## 2020-08-05 PROCEDURE — 99395 PREV VISIT EST AGE 18-39: CPT | Performed by: NURSE PRACTITIONER

## 2020-08-05 PROCEDURE — 87624 HPV HI-RISK TYP POOLED RSLT: CPT | Performed by: NURSE PRACTITIONER

## 2020-08-05 ASSESSMENT — MIFFLIN-ST. JEOR: SCORE: 1801.46

## 2020-08-07 LAB
COPATH REPORT: NORMAL
PAP: NORMAL

## 2020-08-11 ENCOUNTER — PATIENT OUTREACH (OUTPATIENT)
Dept: FAMILY MEDICINE | Facility: OTHER | Age: 40
End: 2020-08-11

## 2020-08-11 LAB
FINAL DIAGNOSIS: ABNORMAL
HPV HR 12 DNA CVX QL NAA+PROBE: POSITIVE
HPV16 DNA SPEC QL NAA+PROBE: NEGATIVE
HPV18 DNA SPEC QL NAA+PROBE: NEGATIVE
SPECIMEN DESCRIPTION: ABNORMAL
SPECIMEN SOURCE CVX/VAG CYTO: ABNORMAL

## 2020-08-17 ENCOUNTER — NURSE TRIAGE (OUTPATIENT)
Dept: NURSING | Facility: CLINIC | Age: 40
End: 2020-08-17

## 2020-08-17 NOTE — TELEPHONE ENCOUNTER
"Pt reports stung by a bee on Friday between elbow and wrist inner L arm. Pt reports red, swollen area about one inch and red area 2 x 2 inches. Redness \"is the same as right after\". Pt denies pain. See full assessment below.    See Care Advice reviewed with pt below. Advised pt to call back if redness increased, red streak, fever, increased pain occur.     Pt verbalizes understanding and agrees to plan.     Additional Information    Negative: [1] Life-threatening reaction (anaphylaxis) in the past to sting AND [2] < 2 hours since sting    Negative: Attacked by swarm of bees now    Negative: Passed out (i.e., lost consciousness, collapsed and was not responding)    Negative: Wheezing, stridor, or difficulty breathing    Negative: [1] Hoarseness or cough AND [2] sudden onset following sting    Negative: [1] Tightness in the throat or chest AND [2] sudden onset following sting    Negative: [1] Swollen tongue or difficulty swallowing AND [2] sudden onset following sting    Negative: Sounds like a life-threatening emergency to the triager    Negative: Not a bee, wasp, hornet, or yellow jacket sting    Negative: Ring stuck on swollen finger (or toe) following bee sting    Negative: [1] Widespread hives, itching or facial swelling AND [2] started within 2 hours of sting (Exception: only at site of sting)    Negative: [1] Vomiting or abdominal cramps AND [2] started within 2 hours of sting    Negative: [1] Gave epinephrine shot AND [2] no symptoms now    Negative: Sting inside the mouth    Negative: Sting on eyeball (e.g., cornea)    Negative: Patient sounds very sick or weak to the triager    Negative: More than 50 stings    Negative: [1] Fever AND [2] red area    Negative: [1] Fever AND [2] area is very tender to touch    Negative: [1] Red streak or red line AND [2] length > 2 inches (5 cm)    Negative: [1] Red or very tender (to touch) area AND [2] started over 24 hours after the sting    Negative: [1] Red or very tender " (to touch) area AND [2] getting larger over 48 hours after the sting    Negative: Swelling is huge (e.g., > 4 inches or 10 cm, spreads beyond wrist or ankle)    Negative: [1] Widespread hives, itching or facial swelling AND [2] started over 2 hours after sting  (Exception: only at site of sting)    Negative: [1] Scab is present AND [2] it drains pus or increases in size AND [3] not improved after applying  antibiotic ointment for 2 days    Normal local reaction to bee, wasp, or yellow jacket sting    Protocols used: BEE OR YELLOW JACKET STING-A-

## 2020-08-21 ENCOUNTER — MYC MEDICAL ADVICE (OUTPATIENT)
Dept: FAMILY MEDICINE | Facility: OTHER | Age: 40
End: 2020-08-21

## 2020-08-24 ENCOUNTER — TELEPHONE (OUTPATIENT)
Dept: FAMILY MEDICINE | Facility: OTHER | Age: 40
End: 2020-08-24

## 2020-08-24 ENCOUNTER — MYC MEDICAL ADVICE (OUTPATIENT)
Dept: FAMILY MEDICINE | Facility: OTHER | Age: 40
End: 2020-08-24

## 2020-08-24 ASSESSMENT — ANXIETY QUESTIONNAIRES
5. BEING SO RESTLESS THAT IT IS HARD TO SIT STILL: NEARLY EVERY DAY
6. BECOMING EASILY ANNOYED OR IRRITABLE: NOT AT ALL
1. FEELING NERVOUS, ANXIOUS, OR ON EDGE: NEARLY EVERY DAY
IF YOU CHECKED OFF ANY PROBLEMS ON THIS QUESTIONNAIRE, HOW DIFFICULT HAVE THESE PROBLEMS MADE IT FOR YOU TO DO YOUR WORK, TAKE CARE OF THINGS AT HOME, OR GET ALONG WITH OTHER PEOPLE: VERY DIFFICULT
2. NOT BEING ABLE TO STOP OR CONTROL WORRYING: NEARLY EVERY DAY
GAD7 TOTAL SCORE: 18
7. FEELING AFRAID AS IF SOMETHING AWFUL MIGHT HAPPEN: NEARLY EVERY DAY
3. WORRYING TOO MUCH ABOUT DIFFERENT THINGS: NEARLY EVERY DAY

## 2020-08-24 ASSESSMENT — PATIENT HEALTH QUESTIONNAIRE - PHQ9: 5. POOR APPETITE OR OVEREATING: NEARLY EVERY DAY

## 2020-08-24 NOTE — TELEPHONE ENCOUNTER
Reason for call:  Same Day Appointment   Requested Provider: [place]    PCP: [unfilled]    Reason for visit: Mother recently passed away, wants to talk about how to deal with it    Duration of symptoms: N/A    Have you been treated for this in the past? No    Additional comments: Wants a same day appointment if possible      Phone number to reach patient:  Home number on file [phone] (home)    Best Time:  Anytime    Can we leave a detailed message on this number?  YES    Travel screening: Not Applicable

## 2020-08-24 NOTE — TELEPHONE ENCOUNTER
Spoke with patient appointment scheduled     Next 5 appointments (look out 90 days)    Aug 25, 2020  2:00 PM CDT  Office Visit with KIMBERLY Erickson CNP  [facility] ([facility]) [address]  [place] [id number]  [phone]        Closing encounter  Liza Castle RT (R)

## 2020-08-24 NOTE — PROGRESS NOTES
Subjective     Laura Guajardo is a 39 year old female who presents to clinic today for the following health issues:    HPI       Anxiety Follow-Up    How are you doing with your anxiety since your last visit? Worsened mother recently passed away    Are you having other symptoms that might be associated with anxiety? Yes:  increased anxiety    Have you had a significant life event? OTHER: Mother passed away from an abdominal anuerism might be hereditary.      Are you feeling depressed? Yes:  due to mother passing unexpectedly     Do you have any concerns with your use of alcohol or other drugs? No    Patient states she brought her mother to ED and found out her mother had a large abdominal aneurysm that ruptured she was taken into emergency surgery and passed away this was very sudden. She has a close family 5 siblings and father is alive he is managing.  is this Friday. Patient is having increase anxiety and feels night is the worst for her unable to sleep     Social History     Tobacco Use     Smoking status: Former Smoker     Packs/day: 0.50     Years: 5.00     Pack years: 2.50     Types: Cigarettes     Smokeless tobacco: Never Used   Substance Use Topics     Alcohol use: Yes     Comment: 0-2 per week      Drug use: No     BLAINE-7 SCORE 2020   Total Score 18     PHQ 2019   PHQ-9 Total Score 8 21   Q9: Thoughts of better off dead/self-harm past 2 weeks Not at all Not at all     Last PHQ-9 2020   1.  Little interest or pleasure in doing things 2   2.  Feeling down, depressed, or hopeless 2   3.  Trouble falling or staying asleep, or sleeping too much 3   4.  Feeling tired or having little energy 3   5.  Poor appetite or overeating 2   6.  Feeling bad about yourself 0   7.  Trouble concentrating 2   8.  Moving slowly or restless 1   Q9: Thoughts of better off dead/self-harm past 2 weeks 0   PHQ-9 Total Score 15   Difficulty at work, home, or with people -     BLAINE-7  2020   1.  Feeling nervous, anxious, or on edge 3   2. Not being able to stop or control worrying 3   3. Worrying too much about different things 3   4. Trouble relaxing 2   5. Being so restless that it is hard to sit still 2   6. Becoming easily annoyed or irritable 1   7. Feeling afraid, as if something awful might happen 2   BLAINE-7 Total Score 16   If you checked any problems, how difficult have they made it for you to do your work, take care of things at home, or get along with other people? -             Review of Systems   Constitutional, HEENT, cardiovascular, pulmonary, GI, , musculoskeletal, neuro, skin, endocrine and psych systems are negative, except as otherwise noted.      Objective    There were no vitals taken for this visit.  There is no height or weight on file to calculate BMI.  Physical Exam   GENERAL: healthy, alert and no distress  NECK: no adenopathy, no asymmetry, masses, or scars and thyroid normal to palpation  RESP: lungs clear to auscultation - no rales, rhonchi or wheezes  CV: regular rate and rhythm, normal S1 S2, no S3 or S4, no murmur, click or rub, no peripheral edema and peripheral pulses strong  PSYCH: mentation appears normal, tearful, fatigued, judgement and insight intact and appearance well groomed            Assessment & Plan     Grief reaction  Will have patient use atarax at bedtime for anxiety as this is the worst time of day for her will have her see therapy for grief management as well  - hydrOXYzine (ATARAX) 25 MG tablet; At bedtime for anxiety and rest  - MENTAL HEALTH REFERRAL  - Adult; Outpatient Treatment; Individual/Couples/Family/Group Therapy/Health Psychology; [facility]: [facility] [phone]; We will contact you to schedule the appointment or please call with any questions     Depression Screening Follow Up    PHQ 8/25/2020   PHQ-9 Total Score 15   Q9: Thoughts of better off dead/self-harm past 2 weeks Not at all     Last PHQ-9 8/25/2020   1.  Little  interest or pleasure in doing things 2   2.  Feeling down, depressed, or hopeless 2   3.  Trouble falling or staying asleep, or sleeping too much 3   4.  Feeling tired or having little energy 3   5.  Poor appetite or overeating 2   6.  Feeling bad about yourself 0   7.  Trouble concentrating 2   8.  Moving slowly or restless 1   Q9: Thoughts of better off dead/self-harm past 2 weeks 0   PHQ-9 Total Score 15   Difficulty at work, home, or with people -       Follow Up Actions Taken  Crisis resource information provided in After Visit Summary  Patient counseled, no additional follow up at this time.         Patient Instructions   Please follow up with me in 4 weeks via my chart.           No follow-ups on file.    KIMBERLY [facility]

## 2020-08-24 NOTE — TELEPHONE ENCOUNTER
Spoke with patient appointment scheduled     Next 5 appointments (look out 90 days)    Aug 25, 2020  2:00 PM CDT  Office Visit with KIMBERLY Erickson CNP  [facility] [address]  [facility] [address]  [phone]        Closing encounter  Liza Castle RT (R)

## 2020-08-25 ENCOUNTER — TELEPHONE (OUTPATIENT)
Dept: FAMILY MEDICINE | Facility: OTHER | Age: 40
End: 2020-08-25

## 2020-08-25 ENCOUNTER — OFFICE VISIT (OUTPATIENT)
Dept: FAMILY MEDICINE | Facility: OTHER | Age: 40
End: 2020-08-25
Payer: COMMERCIAL

## 2020-08-25 VITALS
HEIGHT: 64 IN | OXYGEN SATURATION: 98 % | SYSTOLIC BLOOD PRESSURE: 124 MMHG | TEMPERATURE: 98.9 F | HEART RATE: 54 BPM | BODY MASS INDEX: 41.48 KG/M2 | WEIGHT: 243 LBS | RESPIRATION RATE: 16 BRPM | DIASTOLIC BLOOD PRESSURE: 84 MMHG

## 2020-08-25 DIAGNOSIS — F43.21 GRIEF REACTION: Primary | ICD-10-CM

## 2020-08-25 PROCEDURE — 99213 OFFICE O/P EST LOW 20 MIN: CPT | Performed by: NURSE PRACTITIONER

## 2020-08-25 RX ORDER — HYDROXYZINE HYDROCHLORIDE 25 MG/1
25 TABLET, FILM COATED ORAL
Qty: 30 TABLET | Refills: 0 | Status: SHIPPED | OUTPATIENT
Start: 2020-08-25 | End: 2020-09-08

## 2020-08-25 RX ORDER — HYDROXYZINE HYDROCHLORIDE 25 MG/1
TABLET, FILM COATED ORAL
Qty: 30 TABLET | Refills: 0 | Status: SHIPPED | OUTPATIENT
Start: 2020-08-25 | End: 2021-09-13

## 2020-08-25 ASSESSMENT — PATIENT HEALTH QUESTIONNAIRE - PHQ9
SUM OF ALL RESPONSES TO PHQ QUESTIONS 1-9: 15
10. IF YOU CHECKED OFF ANY PROBLEMS, HOW DIFFICULT HAVE THESE PROBLEMS MADE IT FOR YOU TO DO YOUR WORK, TAKE CARE OF THINGS AT HOME, OR GET ALONG WITH OTHER PEOPLE: VERY DIFFICULT
SUM OF ALL RESPONSES TO PHQ QUESTIONS 1-9: 15

## 2020-08-25 ASSESSMENT — ANXIETY QUESTIONNAIRES
5. BEING SO RESTLESS THAT IT IS HARD TO SIT STILL: MORE THAN HALF THE DAYS
GAD7 TOTAL SCORE: 16
6. BECOMING EASILY ANNOYED OR IRRITABLE: SEVERAL DAYS
GAD7 TOTAL SCORE: 16
1. FEELING NERVOUS, ANXIOUS, OR ON EDGE: NEARLY EVERY DAY
7. FEELING AFRAID AS IF SOMETHING AWFUL MIGHT HAPPEN: MORE THAN HALF THE DAYS
7. FEELING AFRAID AS IF SOMETHING AWFUL MIGHT HAPPEN: MORE THAN HALF THE DAYS
2. NOT BEING ABLE TO STOP OR CONTROL WORRYING: NEARLY EVERY DAY
4. TROUBLE RELAXING: MORE THAN HALF THE DAYS
GAD7 TOTAL SCORE: 16
3. WORRYING TOO MUCH ABOUT DIFFERENT THINGS: NEARLY EVERY DAY

## 2020-08-25 ASSESSMENT — PAIN SCALES - GENERAL: PAINLEVEL: NO PAIN (0)

## 2020-08-25 ASSESSMENT — MIFFLIN-ST. JEOR: SCORE: 1766.21

## 2020-08-25 NOTE — TELEPHONE ENCOUNTER
Called pharmacy. They would like a new script sent over so they have their records straight.     Serafin Jang,

## 2020-08-25 NOTE — TELEPHONE ENCOUNTER
Reason for Call:  Other prescription    Detailed comments: Pharmacy calling on clarification on a medication  hydrOXYzine (ATARAX) 25 MG tablet     Phone Number Patient can be reached at: Other phone number:  [phone]    Best Time: Anytime     Can we leave a detailed message on this number? YES    Call taken on 8/25/2020 at 3:15 PM by Asia Howard

## 2020-08-25 NOTE — TELEPHONE ENCOUNTER
I tried to call pharmacy just kept getting busy signal. RX is Atarax 25 mg one at bedtime for anxiety & rest.     Thank you  Karoline Niño CNP

## 2020-08-26 ASSESSMENT — PATIENT HEALTH QUESTIONNAIRE - PHQ9: SUM OF ALL RESPONSES TO PHQ QUESTIONS 1-9: 15

## 2020-08-26 ASSESSMENT — ANXIETY QUESTIONNAIRES: GAD7 TOTAL SCORE: 16

## 2020-08-31 ENCOUNTER — MYC MEDICAL ADVICE (OUTPATIENT)
Dept: FAMILY MEDICINE | Facility: OTHER | Age: 40
End: 2020-08-31

## 2020-09-01 NOTE — TELEPHONE ENCOUNTER
Responded to patient via [employer]. Will forward to Provider Salinas to advise follow-up based on information given by patient.     Anny Fitzpatrick RN on 9/1/2020 at 5:08 PM

## 2020-09-01 NOTE — TELEPHONE ENCOUNTER
Called and LM for patient to return call to the clinic. Please transfer to any triage RN.   Sent mychart.   Patient did read [employer] message sent yesterday 08/31/2020. However, patient did not respond.   Will postpone call until tomorrow (09/02/2020). If no response will close encounter.     Valentin Berg RN  September 1, 2020

## 2020-09-03 NOTE — TELEPHONE ENCOUNTER
I recommend she is seen in the clinic to evaluate further.  Please call to schedule an appointment.  Marsha Kirby, CNP

## 2020-09-04 NOTE — PROGRESS NOTES
"Subjective     Laura Guajardo is a 39 year old female who presents to clinic today for the following health issues:    HPI       Dizziness  Onset/Duration: 1-2 months  Description:   Do you feel faint: no  Does it feel like the surroundings (bed, room) are moving: no  Unsteady/off balance: YES  Have you passed out or fallen: no  Intensity: mild, moderate  Progression of Symptoms: waxing and waning  Accompanying Signs & Symptoms:  Heart palpitations or chest pain: no  Nausea, vomiting: no  Weakness or lack of coordination in arms or legs: YES  Vision or speech changes: no  Numbness or tingling: YES  Ringing in ears (Tinnitus): YES  Hearing Loss: no  History:   Head trauma/concussion history: no  Previous similar symptoms: YES  Recent bleeding history: no  Any new medications (BP?): no  Precipitating factors:   Worse with activity: YES  Worse with head movement: YES  Alleviating factors:   Does staying in a fixed position give relief: unsure  Therapies tried and outcome: None  No allergy medications. Did not like Zyrtec, felt blah so she stopped this.  Hydroxyzine - took night before mom's . Mind felt clear. Has only taken 1/2 tablet twice. Does not like to take medications if she doesn't have to.   Last year was in the ER for panic attack. Dizziness had preceded the panic attack and is wondering if the dizziness is caused by anxiety.   Sometimes has stuff in nose that \"stinks\" but is clear drainage, sometimes a little green. No pain or pressure in sinuses. No ear pain.   Her mom recently passed away from abdominal aortic aneurysm and she was told that it can be inherited and is wondering about getting an ultrasound.  She says that she tends to worry that she is going to have some illness that will cause her to die.  Losing her mom from the ruptured aneurysm has not helped her anxiety.  She still feels like her mom is here as it has not yet sunken that she is gone.  She had a referral placed for counseling " "but she has not set it up yet.  She plans to set up counseling and has the phone number at home.    Review of Systems   Constitutional, HEENT, cardiovascular, pulmonary, GI, , musculoskeletal, neuro, skin, endocrine and psych systems are negative, except as otherwise noted.      Objective    /88   Pulse 69   Temp 99  F (37.2  C) (Temporal)   Resp 16   Ht 1.632 m (5' 4.25\")   Wt 112.9 kg (249 lb)   LMP 08/16/2020   SpO2 98%   BMI 42.41 kg/m    Body mass index is 42.41 kg/m .  Physical Exam   GENERAL: healthy, alert and no distress  EYES: Eyes grossly normal to inspection, PERRL and conjunctivae and sclerae normal  HENT: ear canals and TM's normal, nose and mouth without ulcers or lesions  NECK: no adenopathy, no asymmetry, masses, or scars and thyroid normal to palpation  RESP: lungs clear to auscultation - no rales, rhonchi or wheezes  CV: regular rate and rhythm, normal S1 S2, no S3 or S4, no murmur, click or rub, no peripheral edema   ABDOMEN: soft, nontender, no hepatosplenomegaly, no masses and bowel sounds normal  MS: no gross musculoskeletal defects noted, no edema  SKIN: no suspicious lesions or rashes  NEURO: Normal strength and tone, mentation intact and speech normal  PSYCH: mentation appears normal, affect normal/bright  LYMPH: no cervical, supraclavicular, axillary, or inguinal adenopathy    No results found for this or any previous visit (from the past 24 hour(s)).        Assessment & Plan     1. BLAINE (generalized anxiety disorder)  Has been doing well with propranolol which has been helping with anxiety.  Recommended she continue to use hydroxyzine as needed for severe anxiety has a couple doses she has tried have worked well.  - propranolol (INDERAL) 20 MG tablet; Take 1 tablet (20 mg) by mouth daily  Dispense: 90 tablet; Refill: 3    2. Benign paroxysmal positional vertigo, unspecified laterality  Has nystagmus with left lateral gaze which may indicate some inner ear cause for vertigo. "  May also be an anxiety and neck tension component to her dizziness.  Does not have any signs of sinus or ear infection.  For now we will work on getting anxiety under better control and monitor dizziness.  She is also going to start a trial of Flonase to see if this helps with intermittent nasal congestion which could be contributing to dizziness.    3. Elevated blood pressure reading without diagnosis of hypertension  Stable continue current medication.  - propranolol (INDERAL) 20 MG tablet; Take 1 tablet (20 mg) by mouth daily  Dispense: 90 tablet; Refill: 3    4. Panic attack  See above notes.  - propranolol (INDERAL) 20 MG tablet; Take 1 tablet (20 mg) by mouth daily  Dispense: 90 tablet; Refill: 3    5. Family history of abdominal aortic aneurysm (AAA)  Mom  recently of ruptured abdominal aortic aneurysm.  Recommend ultrasound since this can be an inherited problem.  - US Abdominal Aorta Imaging; Future         No follow-ups on file.    KIMBERLY Onofre [facility]

## 2020-09-08 ENCOUNTER — OFFICE VISIT (OUTPATIENT)
Dept: FAMILY MEDICINE | Facility: OTHER | Age: 40
End: 2020-09-08
Payer: COMMERCIAL

## 2020-09-08 VITALS
RESPIRATION RATE: 16 BRPM | HEART RATE: 69 BPM | BODY MASS INDEX: 42.51 KG/M2 | OXYGEN SATURATION: 98 % | WEIGHT: 249 LBS | DIASTOLIC BLOOD PRESSURE: 88 MMHG | HEIGHT: 64 IN | TEMPERATURE: 99 F | SYSTOLIC BLOOD PRESSURE: 124 MMHG

## 2020-09-08 DIAGNOSIS — R03.0 ELEVATED BLOOD PRESSURE READING WITHOUT DIAGNOSIS OF HYPERTENSION: ICD-10-CM

## 2020-09-08 DIAGNOSIS — F41.0 PANIC ATTACK: ICD-10-CM

## 2020-09-08 DIAGNOSIS — F41.1 GAD (GENERALIZED ANXIETY DISORDER): Primary | ICD-10-CM

## 2020-09-08 DIAGNOSIS — H81.10 BENIGN PAROXYSMAL POSITIONAL VERTIGO, UNSPECIFIED LATERALITY: ICD-10-CM

## 2020-09-08 DIAGNOSIS — Z82.49 FAMILY HISTORY OF ABDOMINAL AORTIC ANEURYSM (AAA): ICD-10-CM

## 2020-09-08 PROCEDURE — 99214 OFFICE O/P EST MOD 30 MIN: CPT | Performed by: STUDENT IN AN ORGANIZED HEALTH CARE EDUCATION/TRAINING PROGRAM

## 2020-09-08 RX ORDER — PROPRANOLOL HYDROCHLORIDE 20 MG/1
20 TABLET ORAL DAILY
Qty: 90 TABLET | Refills: 3 | Status: SHIPPED | OUTPATIENT
Start: 2020-09-08 | End: 2021-09-13

## 2020-09-08 ASSESSMENT — MIFFLIN-ST. JEOR: SCORE: 1793.43

## 2020-09-08 NOTE — PATIENT INSTRUCTIONS
Try Flonase 1 spray each nostril once daily for one month.    Try to increase water intake.    Trial increase in dose of citalopram to 15 mg daily.    Schedule [facility] [phone]    Marsha Kirby CNP        Benign Paroxysmal Positional Vertigo     Your health care provider may move your head in certain ways to treat your BPPV.     Benign paroxysmal positional vertigo (BPPV) is a problem with the inner ear. The inner ear contains the vestibular system. This system is what helps you keep your balance. BPPV causes a feeling of spinning. It is a common problem of the vestibular system.  Understanding the vestibular system  The vestibular system of the ear is made up of very tiny parts. They include the utricle, saccule, and semicircular canals. The utricle is a tiny organ that contains calcium crystals. In some people, the crystals can move into the semicircular canals. When this happens, the system no longer works as it should. This causes BPPV. Benign means it is not life threatening. Paroxysmal means it happens suddenly. Positional means that it happens when you move your head. Vertigo is a feeling of spinning.  What causes BPPV?  Causes include injury to your head or neck. Other problems with the vestibular system may cause BPPV. In many people, the cause of BPPV is not known.  Symptoms of BPPV  You many have repeated feelings of spinning (vertigo). The vertigo usually lasts less than 1 minute. Some movements, such as rolling over in bed, can bring on vertigo.  Diagnosing BPPV  Your primary healthcare provider may diagnose and treat your BPPV. Or you may see an ear, nose, and throat doctor (otolaryngologist). In some cases, you may see a nervous system doctor (neurologist).  The healthcare provider will ask about your symptoms and your medical history. He or she will examine you. You may have hearing and balance tests. As part of the exam, your healthcare provider may have you move your head and body  in certain ways. If you have BPPV, the movements can bring on vertigo. Your provider will also look for abnormal movements of your eyes. You may have other tests to check your vestibular or nervous systems.  Treatment for BPPV  Your healthcare provider may try to move the calcium crystals. This is done by having you move your head and neck in certain ways. This treatment is safe and often works well. You may also be told to do these movements at home. You may still have vertigo for a few weeks. Your healthcare provider will recheck your symptoms, usually in about a month. Special physical therapy may also be part of treatment. In rare cases, surgery may be needed for BPPV that does not go away.     When to call the healthcare provider  Call your healthcare provider right away if you have any of these:    Symptoms that do not go away with treatment    Symptoms that get worse    New symptoms   Date Last Reviewed: 5/1/2017

## 2020-09-22 ENCOUNTER — ANCILLARY PROCEDURE (OUTPATIENT)
Dept: ULTRASOUND IMAGING | Facility: OTHER | Age: 40
End: 2020-09-22
Attending: STUDENT IN AN ORGANIZED HEALTH CARE EDUCATION/TRAINING PROGRAM
Payer: COMMERCIAL

## 2020-09-22 DIAGNOSIS — Z82.49 FAMILY HISTORY OF ABDOMINAL AORTIC ANEURYSM (AAA): ICD-10-CM

## 2020-09-22 PROCEDURE — 76775 US EXAM ABDO BACK WALL LIM: CPT

## 2020-12-06 ENCOUNTER — HEALTH MAINTENANCE LETTER (OUTPATIENT)
Age: 40
End: 2020-12-06

## 2021-01-20 ENCOUNTER — OFFICE VISIT (OUTPATIENT)
Dept: FAMILY MEDICINE | Facility: OTHER | Age: 41
End: 2021-01-20
Payer: COMMERCIAL

## 2021-01-20 VITALS
TEMPERATURE: 99 F | DIASTOLIC BLOOD PRESSURE: 92 MMHG | BODY MASS INDEX: 45.3 KG/M2 | RESPIRATION RATE: 16 BRPM | SYSTOLIC BLOOD PRESSURE: 154 MMHG | OXYGEN SATURATION: 97 % | WEIGHT: 266 LBS | HEART RATE: 70 BPM

## 2021-01-20 DIAGNOSIS — S16.1XXA STRAIN OF NECK MUSCLE, INITIAL ENCOUNTER: Primary | ICD-10-CM

## 2021-01-20 DIAGNOSIS — J32.9 CHRONIC SINUSITIS, UNSPECIFIED LOCATION: ICD-10-CM

## 2021-01-20 PROCEDURE — 99213 OFFICE O/P EST LOW 20 MIN: CPT | Performed by: STUDENT IN AN ORGANIZED HEALTH CARE EDUCATION/TRAINING PROGRAM

## 2021-01-20 RX ORDER — CYCLOBENZAPRINE HCL 10 MG
5-10 TABLET ORAL 3 TIMES DAILY PRN
Qty: 30 TABLET | Refills: 0 | Status: SHIPPED | OUTPATIENT
Start: 2021-01-20 | End: 2021-09-13

## 2021-01-20 NOTE — PROGRESS NOTES
Assessment & Plan     Strain of neck muscle, initial encounter  Suspect whiplash type injury from slipping on the ice. Pain is isolated to a tight and tender muscle in her left upper trapezius. I recommend rest, light stretching, NSAIDs x 10 days, muscle relaxer as needed - discussed that this can make her drowsy, ice/heat and referral to physical therapy if pain is not improving with outlined plan.  - cyclobenzaprine (FLEXERIL) 10 MG tablet; Take 0.5-1 tablets (5-10 mg) by mouth 3 times daily as needed (neck pain)    Chronic sinusitis, unspecified location  Has had chronic sinusitis and has found relief of pressure and congestion by taking 25-50 mg of Benadryl at bedtime. She has never seen ENT for evaluation of symptoms and recommend seeing Dr. Pringle.   - OTOLARYNGOLOGY REFERRAL    No follow-ups on file.    Arlen Kirby, KIMBERLY CNP  [facility] JOSHUA Sanchez is a 40 year old who presents to clinic today for the following health issues     HPI       Concern - upper back/neck pain  Onset: 2 weeks  Description: dull ache, slipped on the ice when ice fishing and has hurt since then  Intensity: moderate  Progression of Symptoms:  worsening  Accompanying Signs & Symptoms: feels a tight pulling when she moves her neck  Previous history of similar problem: no  Precipitating factors:        Worsened by: nothing  Alleviating factors:        Improved by: nothing  Therapies tried and outcome:  none         Review of Systems   Constitutional, HEENT, cardiovascular, pulmonary, gi and gu systems are negative, except as otherwise noted.      Objective    BP (!) 154/92   Pulse 70   Temp 99  F (37.2  C) (Temporal)   Resp 16   Wt 120.7 kg (266 lb)   LMP 12/25/2020 (Exact Date)   SpO2 97%   BMI 45.30 kg/m    Body mass index is 45.3 kg/m .  Physical Exam   GENERAL APPEARANCE: alert and no distress  EYES: Eyes grossly normal to inspection, PERRL and conjunctivae and sclerae  normal  NECK: no adenopathy and no asymmetry, masses, or scars  MS: tightened tender muscle in the left upper trapezius  SKIN: no suspicious lesions or rashes

## 2021-01-20 NOTE — PATIENT INSTRUCTIONS
Ibuprofen 800 mg three times daily with food for 10 days.    Ice for 20 minutes 3-4 times daily. Alternate with heat     Cyclobenzaprine 1/2 to 1 tablet three times daily as needed to relax muscles.

## 2021-04-19 NOTE — PROGRESS NOTES
"Assessment & Plan     Cervical radiculopathy  Symptoms consistent with cervical radiculopathy.  We will start with a steroid burst to see if this helps calm the inflammation improves symptoms.  I recommend she work with physical therapy.  Continue to use ice or heat to the area.  Also recommend trying massage to loosen up the tight bands of musculature in her right upper trapezius.  If her symptoms are not improving with conservative management we will refer her to orthopedics for further evaluations.  - XR Cervical Spine 2/3 Views; Future  - methylPREDNISolone (MEDROL DOSEPAK) 4 MG tablet therapy pack; Follow Package Directions  - PHYSICAL THERAPY REFERRAL; Future  - traMADol (ULTRAM) 50 MG tablet; Take 1-2 tablets ( mg) by mouth every 6 hours as needed for moderate to severe pain             BMI:   Estimated body mass index is 45.82 kg/m  as calculated from the following:    Height as of 9/8/20: 1.632 m (5' 4.25\").    Weight as of this encounter: 122 kg (269 lb).   Weight management plan: Discussed healthy diet and exercise guidelines        No follow-ups on file.    Arlen Kirby, KIMBERLY CNP  [facility]     Laura Guajardo is a 40 year old female who presents to clinic today for the following health issues:    History of Present Illness       She eats 0-1 servings of fruits and vegetables daily.She consumes 0 sweetened beverage(s) daily.She exercises with enough effort to increase her heart rate 9 or less minutes per day.  She exercises with enough effort to increase her heart rate 3 or less days per week.   She is taking medications regularly.       Musculoskeletal problem/pain  Onset/Duration: 2 weeks   Description  Location: Shoulder and arm - right  Joint Swelling: no  Redness: no  Pain: YES  Warmth: no  Intensity:  3/10 now. At night its worse  Progression of Symptoms:  improving  Accompanying signs and symptoms:   Fevers: no  Numbness/tingling/weakness: " YES  History  Trauma to the area: no  Recent illness:  no  Previous similar problem: no  Previous evaluation:  no  Precipitating or alleviating factors:  Aggravating factors include: certain positions    Therapies tried and outcome: NSAID        Review of Systems   Constitutional, HEENT, cardiovascular, pulmonary, gi and gu systems are negative, except as otherwise noted.      Objective    BP (!) 130/92   Pulse 52   Temp 98.4  F (36.9  C) (Temporal)   Resp 14   Wt 122 kg (269 lb)   LMP 04/17/2021 (Exact Date)   SpO2 99%   BMI 45.82 kg/m    Body mass index is 45.82 kg/m .  Physical Exam   GENERAL APPEARANCE: alert and no distress  MS: tenderness to palpation and tight band of musculature along the right upper trapezius  SKIN: no suspicious lesions or rashes  NEURO: Normal strength and tone, mentation intact and speech normal  PSYCH: mentation appears normal and affect normal/bright    Xr Cervical Spine 2/3 Views    Result Date: 4/20/2021  CERVICAL SPINE TWO TO THREE VIEWS April 20, 2021 11:39 AM HISTORY: Right-sided cervical radiculopathy. Cervical radiculopathy. COMPARISON: None.     IMPRESSION: Slight reversal of the normal cervical lordosis at the level of C6. Otherwise unremarkable alignment. No vertebral body height loss seen. Overall, the cervical intervertebral disc spaces appear grossly maintained. The prevertebral soft tissues appear normal. UGO MEDLEY MD

## 2021-04-20 ENCOUNTER — ANCILLARY PROCEDURE (OUTPATIENT)
Dept: GENERAL RADIOLOGY | Facility: OTHER | Age: 41
End: 2021-04-20
Attending: STUDENT IN AN ORGANIZED HEALTH CARE EDUCATION/TRAINING PROGRAM
Payer: COMMERCIAL

## 2021-04-20 ENCOUNTER — OFFICE VISIT (OUTPATIENT)
Dept: FAMILY MEDICINE | Facility: OTHER | Age: 41
End: 2021-04-20
Payer: COMMERCIAL

## 2021-04-20 VITALS
WEIGHT: 269 LBS | OXYGEN SATURATION: 99 % | TEMPERATURE: 98.4 F | SYSTOLIC BLOOD PRESSURE: 130 MMHG | BODY MASS INDEX: 45.82 KG/M2 | RESPIRATION RATE: 14 BRPM | DIASTOLIC BLOOD PRESSURE: 92 MMHG | HEART RATE: 52 BPM

## 2021-04-20 DIAGNOSIS — M54.12 CERVICAL RADICULOPATHY: Primary | ICD-10-CM

## 2021-04-20 DIAGNOSIS — M54.12 CERVICAL RADICULOPATHY: ICD-10-CM

## 2021-04-20 PROCEDURE — 99213 OFFICE O/P EST LOW 20 MIN: CPT | Performed by: STUDENT IN AN ORGANIZED HEALTH CARE EDUCATION/TRAINING PROGRAM

## 2021-04-20 PROCEDURE — 72040 X-RAY EXAM NECK SPINE 2-3 VW: CPT | Performed by: RADIOLOGY

## 2021-04-20 RX ORDER — METHYLPREDNISOLONE 4 MG
TABLET, DOSE PACK ORAL
Qty: 21 TABLET | Refills: 0 | Status: SHIPPED | OUTPATIENT
Start: 2021-04-20 | End: 2021-05-27

## 2021-04-20 NOTE — PATIENT INSTRUCTIONS
Start physical therapy - You will receive a call to set up an appointment     Start steroid     If not improving with physical therapy or worsening, let me know and I'll refer you to orthopedic spine specialist.

## 2021-04-21 ENCOUNTER — MYC MEDICAL ADVICE (OUTPATIENT)
Dept: FAMILY MEDICINE | Facility: OTHER | Age: 41
End: 2021-04-21

## 2021-04-21 RX ORDER — TRAMADOL HYDROCHLORIDE 50 MG/1
50-100 TABLET ORAL EVERY 6 HOURS PRN
Qty: 20 TABLET | Refills: 0 | Status: SHIPPED | OUTPATIENT
Start: 2021-04-21 | End: 2021-04-24

## 2021-04-30 ENCOUNTER — HOSPITAL ENCOUNTER (OUTPATIENT)
Dept: PHYSICAL THERAPY | Facility: CLINIC | Age: 41
Setting detail: THERAPIES SERIES
End: 2021-04-30
Attending: STUDENT IN AN ORGANIZED HEALTH CARE EDUCATION/TRAINING PROGRAM
Payer: COMMERCIAL

## 2021-04-30 DIAGNOSIS — M54.12 CERVICAL RADICULOPATHY: ICD-10-CM

## 2021-04-30 PROCEDURE — 97110 THERAPEUTIC EXERCISES: CPT | Mod: GP | Performed by: PHYSICAL THERAPIST

## 2021-04-30 PROCEDURE — 97112 NEUROMUSCULAR REEDUCATION: CPT | Mod: GP | Performed by: PHYSICAL THERAPIST

## 2021-04-30 PROCEDURE — 97161 PT EVAL LOW COMPLEX 20 MIN: CPT | Mod: GP | Performed by: PHYSICAL THERAPIST

## 2021-04-30 NOTE — PROGRESS NOTES
04/30/21 1500   General Information   Type of Visit Initial OP Ortho PT Evaluation   Start of Care Date 04/30/21   Referring Physician Arlen Kirby, APRN, CNP   Patient/Family Goals Statement wants numbness, tingling and pain to go away   Orders Evaluate and Treat   Date of Order 04/20/21   Certification Required? No   Medical Diagnosis Cervical radiculopathy   Surgical/Medical history reviewed Yes   Precautions/Limitations no known precautions/limitations   Body Part(s)   Body Part(s) Cervical Spine   Presentation and Etiology   Pertinent history of current problem (include personal factors and/or comorbidities that impact the POC) Pt slipped on the ice while ice fishing and caught herself but after she had some neck pain that came on a week later and then beginning April she started getting R UE sx into her thumbs and ears started ringing as well. . She recently took Medrol dose pack and it helped. Also takes tylenol, IBP. Had neck rays that were negative. No hx of neck pain or headaches. Notes R forearm soreness since May of 2020 for overuse using a screw gun. Pt has been doing neck flexion and sidebending stretches and UE AROM. Hx of vertigo 1x per year over the past couple of years, ? allergy related.    Impairments A. Pain;K. Numbness;L. Tingling;N. Headaches   Functional Limitations perform activities of daily living;perform required work activities;perform desired leisure / sports activities   Symptom Location R sided neck pain to R upper trap, L sided tightness, R scapula, R lateral upper extremity to R thumb, can get numbness and tingling entire UE and pain, occipital pain ot parietal to frontal and B tinnitus   How/Where did it occur Other   Onset date of current episode/exacerbation 01/01/21  (approximately)   Chronicity Chronic   Pain rating (0-10 point scale) Best (/10);Worst (/10)   Best (/10) average neck pain 4-5/10, R UE 4-5/10, head 2-3/10   Worst (/10) 8/10 for neck and R UE, head 6/10    Pain quality B. Dull;A. Sharp;C. Aching;G. Cramping   Frequency of pain/symptoms A. Constant  (constant R neck, R upper arm and forearm, B ears)   Pain/symptoms are: Worse in the morning   Pain/symptoms exacerbated by A. Sitting;G. Certain positions;I. Bending;J. ADL;K. Home tasks;L. Work tasks;M. Other   Pain exacerbation comment lying on L side with arm across body, biking   Pain/symptoms eased by B. Walking;C. Rest;E. Changing positions;G. Heat;I. OTC medication(s);K. Other   Pain eased by comment lying flat with arms at side   Progression of symptoms since onset: Improved  (since steroid)   Prior Level of Function   Prior Level of Function-Mobility ind   Prior Level of Function-ADLs ind   Functional Level Prior Comment likes to do crafts and woodworking, couple of hours per day, more on weekends   Current Level of Function   Patient role/employment history A. Employed   Employment Comments , sits 75% of day   Fall Risk Screen   Have you fallen 2 or more times in the past year? No   Have you fallen and had an injury in the past year? Yes   Is patient a fall risk? No   Abuse Screen (yes response referral indicated)   Feels Unsafe at Home or Work/School no   Feels Threatened by Someone no   Does Anyone Try to Keep You From Having Contact with Others or Doing Things Outside Your Home? no   Physical Signs of Abuse Present no   Functional Scales   Functional Scales Other   Other Scales  NDI 32%, wakes 3 or more times per night due to pain   Cervical Spine   Observation no pain behaviors at rest   Posture fair sitting and standing posture, slight forward head   Cervical Flexion ROM wnl   Cervical Extension ROM 25% decrease, retraction wnl   Cervical Right Side Bending ROM 25% decrease   Cervical Left Side Bending ROM wnl   Cervical Right Rotation ROM 25% decrease   Cervical Left Rotation ROM wnl   Cervical/Shoulder Special Tests Comments did mechanical neck exam for directional preference using  "static/dynamic force analysis testing. In sitting prior to testing: R neck pain 2/10, R>L upper trap tightness 3-4/10, R lateral upper arm 1/2\" wide to elbow 2/10, occipital, to parietal to frontal 1/10. Cervical retraction 25% x 10 no change, 50% abolished frontal, parietal and occipital sx. Retraction to 75% decreasd R UE to .5/10, retraction to 100% abolished R UE sx, better.    Planned Therapy Interventions   Planned Therapy Interventions manual therapy;ROM;strengthening;neuromuscular re-education;stretching   Planned Modality Interventions   Planned Modality Interventions Ultrasound   Planned Modality Interventions Comments if needed   Clinical Impression   Criteria for Skilled Therapeutic Interventions Met yes, treatment indicated   PT Diagnosis mechanical neck pain with right upper extremity and headache referral   Influenced by the following impairments pain, ROM   Functional limitations due to impairments bending, sitting, standing, sleep, adl's, using bike   Clinical Presentation Stable/Uncomplicated   Clinical Presentation Rationale noting improvement   Clinical Decision Making (Complexity) Low complexity   Therapy Frequency 1 time/week   Predicted Duration of Therapy Intervention (days/wks) 3 months, decrease as able   Risk & Benefits of therapy have been explained Yes   Patient, Family & other staff in agreement with plan of care Yes   Education Assessment   Preferred Learning Style Listening   Barriers to Learning No barriers   ORTHO GOALS   PT Ortho Eval Goals 1;2   Ortho Goal 1   Goal Identifier pain   Goal Description pt will note a 50% or greater reduction in average pain level and carry over to no longer waking 3+ times per night due ot pain but 1 or less consistently   Target Date 05/21/21   Ortho Goal 2   Goal Identifier function   Goal Description pt will note further decrease in sx and carry over to improved functional level as measured by NDI score decrease from 32% to 20% or less   Target " Date 06/11/21   Total Evaluation Time   PT Eval, Low Complexity Minutes (72412) 35

## 2021-05-12 ENCOUNTER — HOSPITAL ENCOUNTER (OUTPATIENT)
Dept: PHYSICAL THERAPY | Facility: CLINIC | Age: 41
Setting detail: THERAPIES SERIES
End: 2021-05-12
Attending: STUDENT IN AN ORGANIZED HEALTH CARE EDUCATION/TRAINING PROGRAM
Payer: COMMERCIAL

## 2021-05-12 PROCEDURE — 97140 MANUAL THERAPY 1/> REGIONS: CPT | Mod: GP | Performed by: PHYSICAL THERAPIST

## 2021-05-12 PROCEDURE — 97530 THERAPEUTIC ACTIVITIES: CPT | Mod: GP | Performed by: PHYSICAL THERAPIST

## 2021-05-12 PROCEDURE — 97110 THERAPEUTIC EXERCISES: CPT | Mod: GP | Performed by: PHYSICAL THERAPIST

## 2021-05-26 ENCOUNTER — MYC MEDICAL ADVICE (OUTPATIENT)
Dept: FAMILY MEDICINE | Facility: OTHER | Age: 41
End: 2021-05-26

## 2021-05-26 NOTE — TELEPHONE ENCOUNTER
Her legs have been swelling for 2 days.  Swelling goes to her knees.  Both legs are swollen equally.  They have swelled like this before when it is hot.    If she elevates them, the swelling goes down.    No chest pain, no trouble breathing, feet are not blue or cold, no pain in her legs, no redness, no fever.    She states that the swelling goes away when she elevates her legs.    She would like to be seen to get her legs checked.    Per protocol patient was given home care advice.  An appointment was made for tomorrow.      NURSING PLAN: Nursing advice to patient home care advice    RECOMMENDED DISPOSITION:  Home care advice - patient would like to be seen  Will comply with recommendation: Yes  If further questions/concerns or if symptoms do not improve, worsen or new symptoms develop, call your PCP or [facility] Nurse Advisors as soon as possible.      Guideline used: leg pain/ swelling  Telephone Triage Protocols for Nurses, Fifth Edition, Sofia Das RN

## 2021-05-27 ENCOUNTER — OFFICE VISIT (OUTPATIENT)
Dept: FAMILY MEDICINE | Facility: CLINIC | Age: 41
End: 2021-05-27
Payer: COMMERCIAL

## 2021-05-27 VITALS
WEIGHT: 264 LBS | HEART RATE: 78 BPM | SYSTOLIC BLOOD PRESSURE: 138 MMHG | OXYGEN SATURATION: 99 % | RESPIRATION RATE: 16 BRPM | DIASTOLIC BLOOD PRESSURE: 76 MMHG | HEIGHT: 64 IN | BODY MASS INDEX: 45.07 KG/M2 | TEMPERATURE: 98.5 F

## 2021-05-27 DIAGNOSIS — R60.0 BILATERAL LOWER EXTREMITY EDEMA: Primary | ICD-10-CM

## 2021-05-27 PROCEDURE — 99214 OFFICE O/P EST MOD 30 MIN: CPT | Performed by: FAMILY MEDICINE

## 2021-05-27 PROCEDURE — 93000 ELECTROCARDIOGRAM COMPLETE: CPT | Performed by: FAMILY MEDICINE

## 2021-05-27 PROCEDURE — 36415 COLL VENOUS BLD VENIPUNCTURE: CPT | Performed by: FAMILY MEDICINE

## 2021-05-27 PROCEDURE — 80053 COMPREHEN METABOLIC PANEL: CPT | Performed by: FAMILY MEDICINE

## 2021-05-27 PROCEDURE — 84443 ASSAY THYROID STIM HORMONE: CPT | Performed by: FAMILY MEDICINE

## 2021-05-27 ASSESSMENT — PAIN SCALES - GENERAL: PAINLEVEL: NO PAIN (1)

## 2021-05-27 ASSESSMENT — PATIENT HEALTH QUESTIONNAIRE - PHQ9
SUM OF ALL RESPONSES TO PHQ QUESTIONS 1-9: 4
10. IF YOU CHECKED OFF ANY PROBLEMS, HOW DIFFICULT HAVE THESE PROBLEMS MADE IT FOR YOU TO DO YOUR WORK, TAKE CARE OF THINGS AT HOME, OR GET ALONG WITH OTHER PEOPLE: SOMEWHAT DIFFICULT
SUM OF ALL RESPONSES TO PHQ QUESTIONS 1-9: 4

## 2021-05-27 ASSESSMENT — ANXIETY QUESTIONNAIRES
1. FEELING NERVOUS, ANXIOUS, OR ON EDGE: NOT AT ALL
6. BECOMING EASILY ANNOYED OR IRRITABLE: NOT AT ALL
3. WORRYING TOO MUCH ABOUT DIFFERENT THINGS: SEVERAL DAYS
GAD7 TOTAL SCORE: 3
7. FEELING AFRAID AS IF SOMETHING AWFUL MIGHT HAPPEN: SEVERAL DAYS
7. FEELING AFRAID AS IF SOMETHING AWFUL MIGHT HAPPEN: SEVERAL DAYS
2. NOT BEING ABLE TO STOP OR CONTROL WORRYING: NOT AT ALL
GAD7 TOTAL SCORE: 3
GAD7 TOTAL SCORE: 3
5. BEING SO RESTLESS THAT IT IS HARD TO SIT STILL: NOT AT ALL
4. TROUBLE RELAXING: SEVERAL DAYS

## 2021-05-27 ASSESSMENT — MIFFLIN-ST. JEOR: SCORE: 1856.47

## 2021-05-27 NOTE — PATIENT INSTRUCTIONS
Important Takeaway Points From This Visit:    I will call with your lab results.    I recommend elevation for your legs when get a chance.    Activity or breaks/walks can be helpful.    Look for closed toed compression socks at least knee high that can give 20-30 mmHg      As always, please call with any questions or concerns. I look forward to seeing you again soon!    Take care,  Dr. Sterling    Your current medication list is printed. Please keep this with you - it is helpful to bring this current list to any other medical appointments. It can also be helpful if you ever go to the emergency room or hospital.    If you had lab testing today we will call you with the results. The phone number we will call with your results is # [phone] (home) . If this is not the best number please call our clinic and change the number.    If you need any refills, please call your pharmacy and they will contact us.    If you have any further concerns or wish to schedule another appointment, please call our office at [phone].    If you have a medical emergency, please call [phone].    Thank you for coming to [facility]!

## 2021-05-27 NOTE — PROGRESS NOTES
Assessment & Plan   1. Bilateral lower extremity edema: Unclear etiology.  Most likely due to venous insufficiency.  Discussed use of compression socks, elevation.  Could consider low-dose diuretic such as HCTZ if bothersome in the future.  We will work-up further by checking thyroid, albumin, kidney function and electrolytes, and echocardiogram.  Wells criteria score of 0 clinically making DVT unlikely.  Patient is also having bilateral symptoms.  Encourage weight loss.  Will call with results when available.  Patient agreeable plan.  - REVIEW OF HEALTH MAINTENANCE PROTOCOL ORDERS  - EKG 12-lead complete w/read - Clinics  - Comprehensive metabolic panel (BMP + Alb, Alk Phos, ALT, AST, Total. Bili, TP)  - TSH with free T4 reflex  - Echocardiogram Complete; Future     Return in about 2 weeks (around 6/10/2021), or if symptoms worsen or fail to improve.    Willie Sterling MD  [facility]    This chart is completed utilizing dictation software; typos and/or incorrect word substitutions may unintentionally occur.     Subjective     Laura Guajardo is a 40 year old female who presents to clinic today for the following health issues:    HPI   Patient states she has noticed increased leg swelling bilaterally in her lower legs over the last 2 days.  Was worse yesterday with some improvement today.  She denies any leg or calf pain.  She denies any chest pains or shortness of breath.  She denies any fever, chills, rash, or other associated symptoms.  Once he is checked out to make sure is nothing concerning.  She does work at a desk job and billing.    Swelling is improved with elevation.If she elevates them, the swelling goes down.    No prior surgeries or injuries to the area.     She has no other concerns today.     Review of Systems   Constitutional, HEENT, lymph, Derm, cardiovascular, pulmonary, gi and gu systems are negative, except as otherwise noted.      Objective    /76  "  Pulse 78   Temp 98.5  F (36.9  C) (Temporal)   Resp 16   Ht 1.632 m (5' 4.25\")   Wt 119.7 kg (264 lb)   LMP 05/14/2021 (Approximate)   SpO2 99%   BMI 44.96 kg/m    Body mass index is 44.96 kg/m .  Physical Exam   General: Appears well and in no acute distress.  Cardiovascular: Regular rate and rhythm, normal S1 and S2 without murmur. No extra heartsounds or friction rub. Radial pulses present and equal bilaterally.  Respiratory: Lungs clear to auscultation bilaterally. No wheezing or crackles. No prolonged expiration. Symmetrical chest rise.  Musculoskeletal: 1+ pitting edema of the bilateral lower extremities up to the level of the knee.  Right calf measures 19 inches and left calf measures 18.75 inches.  No gross extremity deformities. No peripheral edema. Normal muscle bulk.  Negative Irineo sign.  Derm: No suspicious lesions or rashes over exposed surfaces.    Labs: pending    Echocardiogram pending    EKG showing normal sinus rhythm with rate of approximately 62.  Normal progression of precordial leads.  No ST segment or T wave changes concerning for acute ischemia.      Answers for HPI/ROS submitted by the patient on 5/27/2021   If you checked off any problems, how difficult have these problems made it for you to do your work, take care of things at home, or get along with other people?: Somewhat difficult  PHQ9 TOTAL SCORE: 4  BLAINE 7 TOTAL SCORE: 3    "

## 2021-05-28 LAB
ALBUMIN SERPL-MCNC: 3.8 G/DL (ref 3.4–5)
ALP SERPL-CCNC: 41 U/L (ref 40–150)
ALT SERPL W P-5'-P-CCNC: 38 U/L (ref 0–50)
ANION GAP SERPL CALCULATED.3IONS-SCNC: 5 MMOL/L (ref 3–14)
AST SERPL W P-5'-P-CCNC: 20 U/L (ref 0–45)
BILIRUB SERPL-MCNC: 0.3 MG/DL (ref 0.2–1.3)
BUN SERPL-MCNC: 10 MG/DL (ref 7–30)
CALCIUM SERPL-MCNC: 8.8 MG/DL (ref 8.5–10.1)
CHLORIDE SERPL-SCNC: 103 MMOL/L (ref 94–109)
CO2 SERPL-SCNC: 27 MMOL/L (ref 20–32)
CREAT SERPL-MCNC: 0.9 MG/DL (ref 0.52–1.04)
GFR SERPL CREATININE-BSD FRML MDRD: 79 ML/MIN/{1.73_M2}
GLUCOSE SERPL-MCNC: 88 MG/DL (ref 70–99)
POTASSIUM SERPL-SCNC: 4.1 MMOL/L (ref 3.4–5.3)
PROT SERPL-MCNC: 7.3 G/DL (ref 6.8–8.8)
SODIUM SERPL-SCNC: 135 MMOL/L (ref 133–144)
TSH SERPL DL<=0.005 MIU/L-ACNC: 1.09 MU/L (ref 0.4–4)

## 2021-05-28 ASSESSMENT — PATIENT HEALTH QUESTIONNAIRE - PHQ9: SUM OF ALL RESPONSES TO PHQ QUESTIONS 1-9: 4

## 2021-05-28 ASSESSMENT — ANXIETY QUESTIONNAIRES: GAD7 TOTAL SCORE: 3

## 2021-05-28 NOTE — RESULT ENCOUNTER NOTE
Please inform of results if patient has not viewed in [employer].    Your thyroid lab results came back normal.    Please call the clinic with any questions you may have.     Have a great day,    Dr. Pope

## 2021-05-28 NOTE — RESULT ENCOUNTER NOTE
Please inform of results if patient has not viewed in [employer].    Your kidney function, liver, function, electrolyte, and protein level lab results came back normal.    Please call the clinic with any questions you may have.     Have a great day,    Dr. Pope

## 2021-06-22 NOTE — PROGRESS NOTES
Outpatient Physical Therapy Discharge Note     Patient: Laura Guajardo  : 1980    Beginning/End Dates of Reporting Period:  21 to 2021 (pt seen for 2 PT visits from  to 21 with cancels on  and 21)    Referring Provider: KIMBERLY Berry, CNP    Therapy Diagnosis: mechanical neck pain with right upper extremity and headache referral     Client Self Report: Better, sx not into R hand/thumb lately. Did have bad day after sleeping on a different bed/pillow at a hotel but sx less with chin tucks. Some days not able to do chin tucks.     Objective Measurements:21  Objective Measure: average neck pain and R upper extremity pain (at eval on 21 4-5/10)     Objective Measure: frequency of pain (at eval constant R neck pain, R UE pain, occipital sx)     Objective Measure: number of times waking at night due to pain (3+ per night at eval)     Objective Measure: NDI (32% at eval)     Objective Measure: number of pain relieving exercises per day (PREP)  Details: some days none  Objective Measure: effect of PREP  Details: helps      Goals:  Goal Identifier pain   Goal Description pt will note a 50% or greater reduction in average pain level and carry over to no longer waking 3+ times per night due ot pain but 1 or less consistently   Target Date 21   Date Met      Progress:     Goal Identifier function   Goal Description pt will note further decrease in sx and carry over to improved functional level as measured by NDI score decrease from 32% to 20% or less   Target Date 21   Date Met      Progress:       Progress Toward Goals:   Progress this reporting period: pt noted improvement at 2nd visit but had to cancel next 2 visits due to personal reasons. PT has not been notified of any problems or questions since last visit.    Pt would have benefited from continued PT.    Plan:  Discharge from therapy.    Discharge:    Reason for Discharge: Patient chooses to  discontinue therapy.  Patient has failed to schedule further appointments. PT has not been notified of any problems or questions since last visit.    Equipment Issued: none    Discharge Plan: Patient to continue home program.

## 2021-06-23 ENCOUNTER — OFFICE VISIT (OUTPATIENT)
Dept: FAMILY MEDICINE | Facility: CLINIC | Age: 41
End: 2021-06-23
Payer: COMMERCIAL

## 2021-06-23 ENCOUNTER — MYC MEDICAL ADVICE (OUTPATIENT)
Dept: FAMILY MEDICINE | Facility: OTHER | Age: 41
End: 2021-06-23

## 2021-06-23 VITALS
SYSTOLIC BLOOD PRESSURE: 132 MMHG | BODY MASS INDEX: 44.73 KG/M2 | HEART RATE: 72 BPM | HEIGHT: 64 IN | RESPIRATION RATE: 16 BRPM | DIASTOLIC BLOOD PRESSURE: 84 MMHG | OXYGEN SATURATION: 97 % | WEIGHT: 262 LBS | TEMPERATURE: 98.4 F

## 2021-06-23 DIAGNOSIS — K21.00 GASTROESOPHAGEAL REFLUX DISEASE WITH ESOPHAGITIS, UNSPECIFIED WHETHER HEMORRHAGE: Primary | ICD-10-CM

## 2021-06-23 DIAGNOSIS — Z80.0 FAMILY HISTORY OF COLON CANCER: ICD-10-CM

## 2021-06-23 DIAGNOSIS — D13.91 POLYPOSIS SYNDROME, FAMILIAL: ICD-10-CM

## 2021-06-23 PROCEDURE — 99214 OFFICE O/P EST MOD 30 MIN: CPT | Performed by: FAMILY MEDICINE

## 2021-06-23 ASSESSMENT — MIFFLIN-ST. JEOR: SCORE: 1847.42

## 2021-06-23 NOTE — TELEPHONE ENCOUNTER
She feels like she has something stuck in her throat after she eats.    She is able swallow her saliva.    Per protocol patient should be seen today.    Appointment made.    Liza Das RN on 6/23/2021 at 12:12 PM

## 2021-06-23 NOTE — LETTER

## 2021-06-23 NOTE — PATIENT INSTRUCTIONS
Patient Education     GERD (Adult)    The esophagus is a tube that carries food from the mouth to the stomach. A valve (the LES, lower esophageal sphincter) at the lower end of the esophagus prevents stomach acid from flowing upward. When this valve doesn't work properly, stomach contents may repeatedly flow back up (reflux) into the esophagus. This is called gastroesophageal reflux disease (GERD). GERD can irritate the esophagus. It can cause problems with pain, swallowing or breathing. In severe cases, GERD can cause recurrent pneumonia (from aspiration or breathing in particles) or other serious problems.  Symptoms of reflux include burning, pressure or sharp pain in the upper abdomen or mid to lower chest. The pain can spread to the neck, back, or shoulder. There may be belching, an acid taste in the back of the throat, chronic cough, or sore throat, or hoarseness. GERD symptoms often occur during the day after a big meal. They can also occur at night when lying down.   Home care  Lifestyle changes can help reduce symptoms. If needed, your healthcare provider may prescribe medicines. Symptoms often improve with treatment, but if treatment is stopped, the symptoms often return after a few months. So most persons with GERD will need to continue treatment or get treatment on and off.  Lifestyle changes    Limit or avoid fatty, fried, and spicy foods, as well as coffee, chocolate, mint, and foods with high acid content such as tomatoes and citrus fruit and juices (orange, grapefruit, lemon).    Don t eat large meals, especially at night. Frequent, smaller meals are best. Don't lie down right after eating. And don t eat anything 3 hours before going to bed.    Don't drink alcohol or smoke. As much as possible, stay away from second hand smoke.    If you are overweight, losing weight will reduce symptoms.     Don't wear tight clothing around your stomach area.    If your symptoms occur during sleep, use a foam wedge  "to elevate your upper body (not just your head.) Or, place 4\" blocks under the head of your bed. Or use 2 bed risers under your bedframe.  Medicines  If needed, medicines can help relieve the symptoms of GERD and prevent damage to the esophagus. Discuss a medicine plan with your healthcare provider. This may include one or more of the following medicines:    Antacids to help neutralize the normal acids in your stomach.    Acid blockers (Histamine or H2 blockers) to decrease acid production.    Acid inhibitors (proton pump inhibitors PPIs) to decrease acid production in a different way than the blockers. They may work better, but can take a little longer to take effect.  Take an antacid 30 to 60 minutes after eating and at bedtime, but not at the same time as an acid blocker.  Try not to take medicines such as ibuprofen and aspirin. If you are taking aspirin for your heart or other medical reasons, talk to your healthcare provider about stopping it.  Follow-up care  Follow up with your healthcare provider or as advised by our staff.  When to seek medical advice  Call your healthcare provider if any of the following occur:    Stomach pain gets worse or moves to the lower right abdomen (appendix area)    Chest pain appears or gets worse, or spreads to the back, neck, shoulder, or arm    An over-the-counter trial of medicine doesn't relieve your symptoms    Weight loss that can't be explained    Trouble or pain swallowing    Frequent vomiting (can t keep down liquids)    Blood in the stool or vomit (red or black in color)    Feeling weak or dizzy    Fever of 100.4 F (38 C) or higher, or as directed by your healthcare provider  [employer] last reviewed this educational content on 3/1/2018    3629-7977 [employer]. All rights reserved. This information is not intended as a substitute for professional medical care. Always follow your healthcare professional's instructions.           "

## 2021-06-23 NOTE — PROGRESS NOTES
Assessment & Plan     Gastroesophageal reflux disease with esophagitis, unspecified whether hemorrhage  Break through heart burn despite using PPI for more than 6 months.  - GASTROENTEROLOGY ADULT REF PROCEDURE ONLY; Future  - omeprazole (PRILOSEC) 20 MG DR capsule; Take 1 capsule (20 mg) by mouth daily    Family history of colon cancer  Colon cancer in first degree relative at early 40's  - GASTROENTEROLOGY ADULT REF PROCEDURE ONLY; Future  - omeprazole (PRILOSEC) 20 MG DR capsule; Take 1 capsule (20 mg) by mouth daily    Polyposis syndrome, familial  - GASTROENTEROLOGY ADULT REF PROCEDURE ONLY; Future  - omeprazole (PRILOSEC) 20 MG DR capsule; Take 1 capsule (20 mg) by mouth daily    40  minutes spent on the date of the encounter doing chart review, history and exam, documentation and further activities per the note      No follow-ups on file.    Denise Mills MD  [facility] SAURABH Sanchez is a 40 year old who presents for the following health issues  accompanied by her Self:    History of Present Illness       She eats 0-1 servings of fruits and vegetables daily.She consumes 1 sweetened beverage(s) daily.She exercises with enough effort to increase her heart rate 9 or less minutes per day.  She exercises with enough effort to increase her heart rate 3 or less days per week.   She is taking medications regularly.       GERD/Heartburn  Onset/Duration: Over 1 week  Description: Reflux, acid taste is coming back up from stomach  Intensity:   Progression of Symptoms: same  Accompanying Signs & Symptoms:  Does it feel like food gets stuck or trouble swallowing: YES  Nausea: YES  Vomiting (bloody?): no  Abdominal Pain: no  Black-Tarry stools: no  Bloody stools: no  History:  Previous similar episodes: no  Previous ulcers: no  Precipitating factors:   Caffeine use: no  Alcohol use: YES- seltzers once in a while  NSAID/Aspirin use: YES- ibuprofen  Tobacco use: no  Worse with no  "particular food or drink.  Alleviating factors: None  Therapies tried and outcome:             Lifestyle changes: None            Medications: Tums - helps keep reflux down a little.    No Alarming symptoms. She denies weight changes, she has some odynophagia but no dysphagia, no weight loss, no melena, hematochezia or abdominal pain.    Review of Systems   Constitutional, HEENT, cardiovascular, pulmonary, GI, , musculoskeletal, neuro, skin, endocrine and psych systems are negative, except as otherwise noted.      Objective    /84   Pulse 72   Temp 98.4  F (36.9  C) (Temporal)   Resp 16   Ht 1.632 m (5' 4.25\")   Wt 118.8 kg (262 lb)   SpO2 97%   BMI 44.62 kg/m    Body mass index is 44.62 kg/m .  Physical Exam   GENERAL: healthy, alert and no distress  EYES: Eyes grossly normal to inspection, PERRL and conjunctivae and sclerae normal  HENT: ear canals and TM's normal, nose and mouth without ulcers or lesions  NECK: no adenopathy, no asymmetry, masses, or scars and thyroid normal to palpation  RESP: lungs clear to auscultation - no rales, rhonchi or wheezes  CV: regular rate and rhythm, normal S1 S2, no S3 or S4, no murmur, click or rub, no peripheral edema and peripheral pulses strong  ABDOMEN: soft, nontender, no hepatosplenomegaly, no masses and bowel sounds normal  MS: no gross musculoskeletal defects noted, no edema  NEURO: Normal strength and tone, mentation intact and speech normal            "

## 2021-06-28 ENCOUNTER — TELEPHONE (OUTPATIENT)
Dept: FAMILY MEDICINE | Facility: OTHER | Age: 41
End: 2021-06-28

## 2021-06-28 NOTE — TELEPHONE ENCOUNTER
Date of procedure: 7/27  Colonoscopy and EGD  Surgeon: Dr. Rolle  Prep:Miralax  Packet:Colonoscopy/EGD instructions was given to patient in clinic.   Date: 6/28/2021      Surgery Scheduler

## 2021-06-29 DIAGNOSIS — Z11.59 ENCOUNTER FOR SCREENING FOR OTHER VIRAL DISEASES: ICD-10-CM

## 2021-07-21 ENCOUNTER — PATIENT OUTREACH (OUTPATIENT)
Dept: FAMILY MEDICINE | Facility: OTHER | Age: 41
End: 2021-07-21

## 2021-07-21 ENCOUNTER — NURSE TRIAGE (OUTPATIENT)
Dept: FAMILY MEDICINE | Facility: OTHER | Age: 41
End: 2021-07-21

## 2021-07-21 ENCOUNTER — MYC MEDICAL ADVICE (OUTPATIENT)
Dept: FAMILY MEDICINE | Facility: CLINIC | Age: 41
End: 2021-07-21

## 2021-07-21 DIAGNOSIS — R87.810 CERVICAL HIGH RISK HPV (HUMAN PAPILLOMAVIRUS) TEST POSITIVE: ICD-10-CM

## 2021-07-21 NOTE — TELEPHONE ENCOUNTER
"Advised patient to monitor symptoms.  Patient stated that the discharge is not happening very often, and she has no other symptoms.  Advised patient to call with any further questions, concerns, or worsening symptoms.  Patient stated understanding.    Additional Information    Negative: Pain or burning with passing urine (urination) is main symptom    Negative: Pregnant with vaginal discharge    Negative: SEVERE abdominal pain (e.g., excruciating)    Negative: Patient sounds very sick or weak to the triager    Negative: Yellow or green vaginal discharge and has a fever    Negative: Constant abdominal pain lasting > 2 hours    Negative: Mild lower abdominal pain comes and goes (cramps) that lasts > 24 hours    Negative: Genital area looks infected (e.g., draining sore, spreading redness)    Negative: Rash is tiny water blisters (3 or more)    Negative: Patient wants to be seen    Negative: Rash (e.g., redness, tiny bumps, sore) of genital area present > 24 hours    Negative: Bad smelling vaginal discharge    Negative: Abnormal color vaginal discharge (i.e., yellow, green, gray)    Negative: Symptoms of a yeast infection' (i.e., itchy, white discharge, not bad smelling) and not improved > 3 days following Care Advice    Negative: 4 or more episodes of vaginal infection in past year    Negative: Diabetes mellitus or weak immune system (e.g., HIV positive, cancer chemo, splenectomy, organ transplant, chronic steroids)    Negative: Patient is worried about a sexually transmitted disease (STD)    Negative: Pain with sexual intercourse (dyspareunia) (Exception: vaginal yeast infection suspected)    Normal vaginal discharge    Answer Assessment - Initial Assessment Questions  1. DISCHARGE: \"Describe the discharge.\" (e.g., white, yellow, green, gray, foamy, cottage cheese-like)      Some brown discharge intermittently since after period ended approximately 1 week ago    2. ODOR: \"Is there a bad odor?\"      No    3. ONSET: " "\"When did the discharge begin?\"      Approximately 6 days ago    4. RASH: \"Is there a rash in that area?\" If so, ask: \"Describe it.\" (e.g., redness, blisters, sores, bumps)      No    5. ABDOMINAL PAIN: \"Are you having any abdominal pain?\" If yes: \"What does it feel like? \" (e.g., crampy, dull, intermittent, constant)       No    6. ABDOMINAL PAIN SEVERITY: If present, ask: \"How bad is it?\"  (e.g., mild, moderate, severe)   - MILD - doesn't interfere with normal activities    - MODERATE - interferes with normal activities or awakens from sleep    - SEVERE - patient doesn't want to move (R/O peritonitis)       NA    7. CAUSE: \"What do you think is causing the discharge?\" \"Have you had the same problem before? What happened then?\"      Unknown    8. OTHER SYMPTOMS: \"Do you have any other symptoms?\" (e.g., fever, itching, vaginal bleeding, pain with urination, injury to genital area, vaginal foreign body)      No    9. PREGNANCY: \"Is there any chance you are pregnant?\" \"When was your last menstrual period?\"      NA - tubes tied    Protocols used: VAGINAL [id number]  Radha Reeves RN      "

## 2021-07-22 ENCOUNTER — OFFICE VISIT (OUTPATIENT)
Dept: FAMILY MEDICINE | Facility: OTHER | Age: 41
End: 2021-07-22
Payer: COMMERCIAL

## 2021-07-22 VITALS
TEMPERATURE: 99 F | OXYGEN SATURATION: 98 % | SYSTOLIC BLOOD PRESSURE: 114 MMHG | HEIGHT: 64 IN | BODY MASS INDEX: 44.56 KG/M2 | HEART RATE: 78 BPM | DIASTOLIC BLOOD PRESSURE: 70 MMHG | WEIGHT: 261 LBS | RESPIRATION RATE: 20 BRPM

## 2021-07-22 DIAGNOSIS — R39.15 URINARY URGENCY: ICD-10-CM

## 2021-07-22 DIAGNOSIS — L65.9 HAIR LOSS: ICD-10-CM

## 2021-07-22 DIAGNOSIS — E66.01 MORBID OBESITY (H): ICD-10-CM

## 2021-07-22 DIAGNOSIS — L85.3 DRY SKIN: ICD-10-CM

## 2021-07-22 DIAGNOSIS — R10.2 PELVIC CRAMPING: ICD-10-CM

## 2021-07-22 DIAGNOSIS — R30.0 DYSURIA: Primary | ICD-10-CM

## 2021-07-22 LAB
ALBUMIN UR-MCNC: NEGATIVE MG/DL
APPEARANCE UR: CLEAR
BILIRUB UR QL STRIP: NEGATIVE
CLUE CELLS: ABNORMAL
COLOR UR AUTO: YELLOW
GLUCOSE UR STRIP-MCNC: NEGATIVE MG/DL
HGB UR QL STRIP: NEGATIVE
KETONES UR STRIP-MCNC: NEGATIVE MG/DL
LEUKOCYTE ESTERASE UR QL STRIP: NEGATIVE
NITRATE UR QL: NEGATIVE
PH UR STRIP: 7 [PH] (ref 5–7)
SP GR UR STRIP: 1.01 (ref 1–1.03)
TRICHOMONAS, WET PREP: ABNORMAL
UROBILINOGEN UR STRIP-ACNC: 0.2 E.U./DL
WBC'S/HIGH POWER FIELD, WET PREP: ABNORMAL
YEAST, WET PREP: ABNORMAL

## 2021-07-22 PROCEDURE — 81003 URINALYSIS AUTO W/O SCOPE: CPT | Performed by: PHYSICIAN ASSISTANT

## 2021-07-22 PROCEDURE — 87210 SMEAR WET MOUNT SALINE/INK: CPT | Performed by: PHYSICIAN ASSISTANT

## 2021-07-22 PROCEDURE — 99213 OFFICE O/P EST LOW 20 MIN: CPT | Performed by: PHYSICIAN ASSISTANT

## 2021-07-22 RX ORDER — SACCHAROMYCES BOULARDII 250 MG
250 CAPSULE ORAL 2 TIMES DAILY
COMMUNITY
End: 2021-09-13

## 2021-07-22 ASSESSMENT — MIFFLIN-ST. JEOR: SCORE: 1842.86

## 2021-07-22 NOTE — H&P (VIEW-ONLY)
"    Assessment & Plan     Dysuria  Urinary urgency  Pelvic cramping  Hair loss  Dry skin  In the end with essentially negative labs we discussed auditory cycle and note that she began ovulating about the same time that she had the signs and symptoms.  Advised simple observation and following up with primary care provider if this does not resolve prior to her next physical.  Consideration of ultrasound is discussed with the patient today.  She thinks that she can wait this out.  Consideration of hormone replacement and or birth control pills to stop ovulation as discussed but I advised that she brings up with her primary care provider at her next physical which is in August of this year.  - UA macro with reflex to Microscopic and Culture - Clinc Collect  - Wet prep - Clinic Collect  - HCG Qual, Urine ([id number]); Future     Work on weight loss  Regular exercise  No follow-ups on file.    Derrell Alvarez PA-C  [facility] JOSHUA Sanchez is a 40 year old who presents for the following health issues     HPI     Genitourinary - Female  Onset/Duration: 2 days  Description:   Painful urination (Dysuria): no           Frequency: YES  Blood in urine (Hematuria): no  Delay in urine (Hesitency): no  Intensity: mild  Progression of Symptoms:  same  Accompanying Signs & Symptoms:  Fever/chills: no  Flank pain: no  Nausea and vomiting: no  Vaginal symptoms: none and itching  Abdominal/Pelvic Pain: pelvic pain    History:   History of frequent UTI s: no  History of kidney stones: no  Sexually Active: YES  Possibility of pregnancy: No  Precipitating or alleviating factors: None  Therapies tried and outcome:  none       Review of Systems   Constitutional, HEENT, cardiovascular, pulmonary, GI, , musculoskeletal, neuro, skin, endocrine and psych systems are negative, except as otherwise noted.      Objective    /70   Pulse 78   Temp 99  F (37.2  C) (Temporal)   Resp 20   Ht 1.632 m (5' 4.25\")   " Wt 118.4 kg (261 lb)   SpO2 98%   BMI 44.45 kg/m    Body mass index is 44.45 kg/m .  Physical Exam   GENERAL: healthy, alert and no distress  NECK: no adenopathy, no asymmetry, masses, or scars and thyroid normal to palpation  RESP: lungs clear to auscultation - no rales, rhonchi or wheezes  CV: regular rate and rhythm, normal S1 S2, no S3 or S4, no murmur, click or rub, no peripheral edema and peripheral pulses strong  ABDOMEN: soft, nontender, no hepatosplenomegaly, no masses and bowel sounds normal  MS: no gross musculoskeletal defects noted, no edema  SKIN: no suspicious lesions or rashes to exposed visible skin today.  PSYCH: mentation appears normal, affect normal/bright    Results for orders placed or performed in visit on 07/22/21 (from the past 24 hour(s))   Wet prep - Clinic Collect    Specimen: Vagina; Swab   Result Value Ref Range    Trichomonas Absent Absent    Yeast Absent Absent    Clue Cells Absent Absent    WBCs/high power field 2+ (A) None   UA macro with reflex to Microscopic and Culture - Clinc Collect    Specimen: Urine, Midstream   Result Value Ref Range    Color Urine Yellow Colorless, Straw, Light Yellow, Yellow    Appearance Urine Clear Clear    Glucose Urine Negative Negative mg/dL    Bilirubin Urine Negative Negative    Ketones Urine Negative Negative mg/dL    Specific Gravity Urine 1.015 1.003 - 1.035    Blood Urine Negative Negative    pH Urine 7.0 5.0 - 7.0    Protein Albumin Urine Negative Negative mg/dL    Urobilinogen Urine 0.2 0.2, 1.0 E.U./dL    Nitrite Urine Negative Negative    Leukocyte Esterase Urine Negative Negative    Narrative    Microscopic not indicated

## 2021-07-22 NOTE — PROGRESS NOTES
"    Assessment & Plan     Dysuria  Urinary urgency  Pelvic cramping  Hair loss  Dry skin  In the end with essentially negative labs we discussed auditory cycle and note that she began ovulating about the same time that she had the signs and symptoms.  Advised simple observation and following up with primary care provider if this does not resolve prior to her next physical.  Consideration of ultrasound is discussed with the patient today.  She thinks that she can wait this out.  Consideration of hormone replacement and or birth control pills to stop ovulation as discussed but I advised that she brings up with her primary care provider at her next physical which is in August of this year.  - UA macro with reflex to Microscopic and Culture - Clinc Collect  - Wet prep - Clinic Collect  - HCG Qual, Urine ([id number]); Future     Work on weight loss  Regular exercise  No follow-ups on file.    Derrell Alvarez PA-C  [facility] JOSHUA Sanchez is a 40 year old who presents for the following health issues     HPI     Genitourinary - Female  Onset/Duration: 2 days  Description:   Painful urination (Dysuria): no           Frequency: YES  Blood in urine (Hematuria): no  Delay in urine (Hesitency): no  Intensity: mild  Progression of Symptoms:  same  Accompanying Signs & Symptoms:  Fever/chills: no  Flank pain: no  Nausea and vomiting: no  Vaginal symptoms: none and itching  Abdominal/Pelvic Pain: pelvic pain    History:   History of frequent UTI s: no  History of kidney stones: no  Sexually Active: YES  Possibility of pregnancy: No  Precipitating or alleviating factors: None  Therapies tried and outcome:  none       Review of Systems   Constitutional, HEENT, cardiovascular, pulmonary, GI, , musculoskeletal, neuro, skin, endocrine and psych systems are negative, except as otherwise noted.      Objective    /70   Pulse 78   Temp 99  F (37.2  C) (Temporal)   Resp 20   Ht 1.632 m (5' 4.25\")   " Wt 118.4 kg (261 lb)   SpO2 98%   BMI 44.45 kg/m    Body mass index is 44.45 kg/m .  Physical Exam   GENERAL: healthy, alert and no distress  NECK: no adenopathy, no asymmetry, masses, or scars and thyroid normal to palpation  RESP: lungs clear to auscultation - no rales, rhonchi or wheezes  CV: regular rate and rhythm, normal S1 S2, no S3 or S4, no murmur, click or rub, no peripheral edema and peripheral pulses strong  ABDOMEN: soft, nontender, no hepatosplenomegaly, no masses and bowel sounds normal  MS: no gross musculoskeletal defects noted, no edema  SKIN: no suspicious lesions or rashes to exposed visible skin today.  PSYCH: mentation appears normal, affect normal/bright    Results for orders placed or performed in visit on 07/22/21 (from the past 24 hour(s))   Wet prep - Clinic Collect    Specimen: Vagina; Swab   Result Value Ref Range    Trichomonas Absent Absent    Yeast Absent Absent    Clue Cells Absent Absent    WBCs/high power field 2+ (A) None   UA macro with reflex to Microscopic and Culture - Clinc Collect    Specimen: Urine, Midstream   Result Value Ref Range    Color Urine Yellow Colorless, Straw, Light Yellow, Yellow    Appearance Urine Clear Clear    Glucose Urine Negative Negative mg/dL    Bilirubin Urine Negative Negative    Ketones Urine Negative Negative mg/dL    Specific Gravity Urine 1.015 1.003 - 1.035    Blood Urine Negative Negative    pH Urine 7.0 5.0 - 7.0    Protein Albumin Urine Negative Negative mg/dL    Urobilinogen Urine 0.2 0.2, 1.0 E.U./dL    Nitrite Urine Negative Negative    Leukocyte Esterase Urine Negative Negative    Narrative    Microscopic not indicated

## 2021-07-23 ENCOUNTER — LAB (OUTPATIENT)
Dept: LAB | Facility: OTHER | Age: 41
End: 2021-07-23
Payer: COMMERCIAL

## 2021-07-23 DIAGNOSIS — Z11.59 ENCOUNTER FOR SCREENING FOR OTHER VIRAL DISEASES: ICD-10-CM

## 2021-07-23 PROCEDURE — U0005 INFEC AGEN DETEC AMPLI PROBE: HCPCS

## 2021-07-23 PROCEDURE — U0003 INFECTIOUS AGENT DETECTION BY NUCLEIC ACID (DNA OR RNA); SEVERE ACUTE RESPIRATORY SYNDROME CORONAVIRUS 2 (SARS-COV-2) (CORONAVIRUS DISEASE [COVID-19]), AMPLIFIED PROBE TECHNIQUE, MAKING USE OF HIGH THROUGHPUT TECHNOLOGIES AS DESCRIBED BY CMS-2020-01-R: HCPCS

## 2021-07-24 LAB — SARS-COV-2 RNA RESP QL NAA+PROBE: NEGATIVE

## 2021-07-27 ENCOUNTER — ANESTHESIA EVENT (OUTPATIENT)
Dept: GASTROENTEROLOGY | Facility: CLINIC | Age: 41
End: 2021-07-27
Payer: COMMERCIAL

## 2021-07-27 ENCOUNTER — ANESTHESIA (OUTPATIENT)
Dept: GASTROENTEROLOGY | Facility: CLINIC | Age: 41
End: 2021-07-27
Payer: COMMERCIAL

## 2021-07-27 ENCOUNTER — HOSPITAL ENCOUNTER (OUTPATIENT)
Facility: CLINIC | Age: 41
Discharge: HOME | End: 2021-07-27
Attending: SURGERY | Admitting: SURGERY
Payer: COMMERCIAL

## 2021-07-27 VITALS
TEMPERATURE: 98.1 F | OXYGEN SATURATION: 99 % | DIASTOLIC BLOOD PRESSURE: 88 MMHG | HEART RATE: 67 BPM | SYSTOLIC BLOOD PRESSURE: 132 MMHG | RESPIRATION RATE: 20 BRPM

## 2021-07-27 LAB
COLONOSCOPY: NORMAL
UPPER GI ENDOSCOPY: NORMAL

## 2021-07-27 PROCEDURE — 258N000003 HC RX IP 258 OP 636: Performed by: NURSE ANESTHETIST, CERTIFIED REGISTERED

## 2021-07-27 PROCEDURE — 45378 DIAGNOSTIC COLONOSCOPY: CPT | Performed by: SURGERY

## 2021-07-27 PROCEDURE — 250N000009 HC RX 250: Performed by: SURGERY

## 2021-07-27 PROCEDURE — 43235 EGD DIAGNOSTIC BRUSH WASH: CPT | Mod: 51 | Performed by: SURGERY

## 2021-07-27 PROCEDURE — 370N000017 HC ANESTHESIA TECHNICAL FEE, PER MIN: Performed by: SURGERY

## 2021-07-27 PROCEDURE — G0105 COLORECTAL SCRN; HI RISK IND: HCPCS | Performed by: SURGERY

## 2021-07-27 PROCEDURE — 250N000011 HC RX IP 250 OP 636: Performed by: NURSE ANESTHETIST, CERTIFIED REGISTERED

## 2021-07-27 PROCEDURE — 250N000009 HC RX 250: Performed by: NURSE ANESTHETIST, CERTIFIED REGISTERED

## 2021-07-27 PROCEDURE — 43235 EGD DIAGNOSTIC BRUSH WASH: CPT | Performed by: SURGERY

## 2021-07-27 RX ORDER — NALOXONE HYDROCHLORIDE 0.4 MG/ML
0.2 INJECTION, SOLUTION INTRAMUSCULAR; INTRAVENOUS; SUBCUTANEOUS
Status: DISCONTINUED | OUTPATIENT
Start: 2021-07-27 | End: 2021-07-27 | Stop reason: HOSPADM

## 2021-07-27 RX ORDER — ONDANSETRON 4 MG/1
4 TABLET, ORALLY DISINTEGRATING ORAL EVERY 6 HOURS PRN
Status: DISCONTINUED | OUTPATIENT
Start: 2021-07-27 | End: 2021-07-27 | Stop reason: HOSPADM

## 2021-07-27 RX ORDER — LIDOCAINE HYDROCHLORIDE 20 MG/ML
INJECTION, SOLUTION INFILTRATION; PERINEURAL PRN
Status: DISCONTINUED | OUTPATIENT
Start: 2021-07-27 | End: 2021-07-27

## 2021-07-27 RX ORDER — GLYCOPYRROLATE 0.2 MG/ML
INJECTION, SOLUTION INTRAMUSCULAR; INTRAVENOUS PRN
Status: DISCONTINUED | OUTPATIENT
Start: 2021-07-27 | End: 2021-07-27

## 2021-07-27 RX ORDER — PROCHLORPERAZINE MALEATE 5 MG
10 TABLET ORAL EVERY 6 HOURS PRN
Status: DISCONTINUED | OUTPATIENT
Start: 2021-07-27 | End: 2021-07-27 | Stop reason: HOSPADM

## 2021-07-27 RX ORDER — SODIUM CHLORIDE, SODIUM LACTATE, POTASSIUM CHLORIDE, CALCIUM CHLORIDE 600; 310; 30; 20 MG/100ML; MG/100ML; MG/100ML; MG/100ML
INJECTION, SOLUTION INTRAVENOUS CONTINUOUS
Status: DISCONTINUED | OUTPATIENT
Start: 2021-07-27 | End: 2021-07-27 | Stop reason: HOSPADM

## 2021-07-27 RX ORDER — PROPOFOL 10 MG/ML
INJECTION, EMULSION INTRAVENOUS CONTINUOUS PRN
Status: DISCONTINUED | OUTPATIENT
Start: 2021-07-27 | End: 2021-07-27

## 2021-07-27 RX ORDER — ONDANSETRON 2 MG/ML
4 INJECTION INTRAMUSCULAR; INTRAVENOUS EVERY 6 HOURS PRN
Status: DISCONTINUED | OUTPATIENT
Start: 2021-07-27 | End: 2021-07-27 | Stop reason: HOSPADM

## 2021-07-27 RX ORDER — PROPOFOL 10 MG/ML
INJECTION, EMULSION INTRAVENOUS PRN
Status: DISCONTINUED | OUTPATIENT
Start: 2021-07-27 | End: 2021-07-27

## 2021-07-27 RX ORDER — NALOXONE HYDROCHLORIDE 0.4 MG/ML
0.4 INJECTION, SOLUTION INTRAMUSCULAR; INTRAVENOUS; SUBCUTANEOUS
Status: DISCONTINUED | OUTPATIENT
Start: 2021-07-27 | End: 2021-07-27 | Stop reason: HOSPADM

## 2021-07-27 RX ORDER — LIDOCAINE 40 MG/G
CREAM TOPICAL
Status: DISCONTINUED | OUTPATIENT
Start: 2021-07-27 | End: 2021-07-27 | Stop reason: HOSPADM

## 2021-07-27 RX ORDER — ONDANSETRON 2 MG/ML
4 INJECTION INTRAMUSCULAR; INTRAVENOUS
Status: DISCONTINUED | OUTPATIENT
Start: 2021-07-27 | End: 2021-07-27 | Stop reason: HOSPADM

## 2021-07-27 RX ORDER — FLUMAZENIL 0.1 MG/ML
0.2 INJECTION, SOLUTION INTRAVENOUS
Status: DISCONTINUED | OUTPATIENT
Start: 2021-07-27 | End: 2021-07-27 | Stop reason: HOSPADM

## 2021-07-27 RX ADMIN — SODIUM CHLORIDE, POTASSIUM CHLORIDE, SODIUM LACTATE AND CALCIUM CHLORIDE: 600; 310; 30; 20 INJECTION, SOLUTION INTRAVENOUS at 11:31

## 2021-07-27 RX ADMIN — GLYCOPYRROLATE 0.4 MCG: 0.2 INJECTION, SOLUTION INTRAMUSCULAR; INTRAVENOUS at 12:00

## 2021-07-27 RX ADMIN — PROPOFOL 50 MG: 10 INJECTION, EMULSION INTRAVENOUS at 12:14

## 2021-07-27 RX ADMIN — SODIUM CHLORIDE, POTASSIUM CHLORIDE, SODIUM LACTATE AND CALCIUM CHLORIDE: 600; 310; 30; 20 INJECTION, SOLUTION INTRAVENOUS at 13:04

## 2021-07-27 RX ADMIN — PROPOFOL 50 MG: 10 INJECTION, EMULSION INTRAVENOUS at 12:13

## 2021-07-27 RX ADMIN — LIDOCAINE HYDROCHLORIDE 100 MG: 20 INJECTION, SOLUTION INFILTRATION; PERINEURAL at 12:11

## 2021-07-27 RX ADMIN — LIDOCAINE HYDROCHLORIDE 1 ML: 10 INJECTION, SOLUTION EPIDURAL; INFILTRATION; INTRACAUDAL; PERINEURAL at 11:30

## 2021-07-27 RX ADMIN — PROPOFOL 100 MG: 10 INJECTION, EMULSION INTRAVENOUS at 12:11

## 2021-07-27 RX ADMIN — PROPOFOL 200 MCG/KG/MIN: 10 INJECTION, EMULSION INTRAVENOUS at 12:11

## 2021-07-27 ASSESSMENT — LIFESTYLE VARIABLES: TOBACCO_USE: 1

## 2021-07-27 NOTE — DISCHARGE INSTRUCTIONS
[facility]    Home Care Following Endoscopy          Activity:    You have just undergone an endoscopic procedure usually performed with conscious sedation.  Do not work or operate machinery (including a car) for at least 12 hours.      I encourage you to walk and attempt to pass this air as soon as possible.    Diet:    Return to the diet you were on before your procedure but eat lightly for the first 12-24 hours.    Drink plenty of water.    Resume any regular medications unless otherwise advised by your physician.  Please begin any new medication prescribed as a result of your procedure as directed by your physician.     If you had any biopsy or polyp removed please refrain from aspirin or aspirin products for 2 days.  If on Coumadin please restart as instructed by your physician.   Pain:    You may take Tylenol as needed for pain.  Expected Recovery:    You can expect some mild abdominal fullness and/or discomfort due to the air used to inflate your intestinal tract. It is also normal to have a mild sore throat after upper endoscopy.    Call Your Physician if You Have:    After Upper Endoscopy:  o Shoulder, back or chest pain.  o Difficulty breathing or swallowing.  o Vomiting blood.    After Colonoscopy:  o Worsening persisting abdominal pain which is worse with activity.  o Fevers (>101 degrees F), chills or shakes.  o Passage of continued blood with bowel movements.   Any questions or concerns about your recovery, please call [phone] or after hours [phone] Nurse Advice Line.

## 2021-07-27 NOTE — ANESTHESIA CARE TRANSFER NOTE
Patient: Laura Guajardo    Procedure(s):  COLONOSCOPY  ESOPHAGOGASTRODUODENOSCOPY (EGD)    Diagnosis: Family history of colon cancer [Z80.0]  Polyposis syndrome, familial [D12.6]  Gastroesophageal reflux disease with esophagitis, unspecified whether hemorrhage [K21.00]  Diagnosis Additional Information: No value filed.    Anesthesia Type:   MAC     Note:    Oropharynx: spontaneously breathing  Level of Consciousness: awake  Oxygen Supplementation: room air    Independent Airway: airway patency satisfactory and stable  Dentition: dentition unchanged  Vital Signs Stable: post-procedure vital signs reviewed and stable  Report to RN Given: handoff report given  Patient transferred to: Phase II    Handoff Report: Identifed the Patient, Identified the Reponsible Provider, Reviewed the pertinent medical history, Discussed the surgical course, Reviewed Intra-OP anesthesia mangement and issues during anesthesia, Set expectations for post-procedure period and Allowed opportunity for questions and acknowledgement of understanding      Vitals:  Vitals Value Taken Time   /75 07/27/21 1245   Temp     Pulse 85 07/27/21 1245   Resp     SpO2 98 % 07/27/21 1255   Vitals shown include unvalidated device data.    Electronically Signed By: KIMBERLY Carvajal CRNA  July 27, 2021  12:56 PM

## 2021-07-27 NOTE — ADDENDUM NOTE
Addendum  created 07/27/21 1307 by Alejandro Smith APRN CRNA    Flowsheet accepted, Intraprocedure Flowsheets edited

## 2021-07-27 NOTE — ANESTHESIA POSTPROCEDURE EVALUATION
Patient: Laura Guajardo    Procedure(s):  COLONOSCOPY  ESOPHAGOGASTRODUODENOSCOPY (EGD)    Diagnosis:Family history of colon cancer [Z80.0]  Polyposis syndrome, familial [D12.6]  Gastroesophageal reflux disease with esophagitis, unspecified whether hemorrhage [K21.00]  Diagnosis Additional Information: No value filed.    Anesthesia Type:  MAC    Note:  Disposition: Outpatient   Postop Pain Control: Uneventful            Sign Out: Well controlled pain   PONV: No   Neuro/Psych: Uneventful            Sign Out: Acceptable/Baseline neuro status   Airway/Respiratory: Uneventful            Sign Out: Acceptable/Baseline resp. status   CV/Hemodynamics: Uneventful            Sign Out: Acceptable CV status   Other NRE: NONE   DID A NON-ROUTINE EVENT OCCUR? No    Event details/Postop Comments:  Pt was happy with anesthesia care.  No complications.  I will follow up with the pt if needed.           Last vitals:  Vitals Value Taken Time   /75 07/27/21 1245   Temp     Pulse 85 07/27/21 1245   Resp     SpO2 100 % 07/27/21 1256   Vitals shown include unvalidated device data.    Electronically Signed By: KIMBERLY Carvajal CRNA  July 27, 2021  12:57 PM

## 2021-07-27 NOTE — ANESTHESIA PREPROCEDURE EVALUATION
Anesthesia Pre-Procedure Evaluation    Patient: Laura Guajardo   MRN: [id number] : 1980        Preoperative Diagnosis: Family history of colon cancer [Z80.0]  Polyposis syndrome, familial [D12.6]  Gastroesophageal reflux disease with esophagitis, unspecified whether hemorrhage [K21.00]   Procedure : Procedure(s):  COLONOSCOPY  ESOPHAGOGASTRODUODENOSCOPY (EGD)     Past Medical History:   Diagnosis Date     Anxiety      Cervical high risk HPV (human papillomavirus) test positive 2020    See problem list      Past Surgical History:   Procedure Laterality Date     TUBAL LIGATION        No Known Allergies   Social History     Tobacco Use     Smoking status: Former Smoker     Packs/day: 0.50     Years: 5.00     Pack years: 2.50     Types: Cigarettes     Smokeless tobacco: Never Used   Substance Use Topics     Alcohol use: Yes     Comment: 0-2 per week       Wt Readings from Last 1 Encounters:   21 118.4 kg (261 lb)        Anesthesia Evaluation            ROS/MED HX  ENT/Pulmonary:     (+) tobacco use, Past use,     Neurologic:  - neg neurologic ROS     Cardiovascular:     (+) Dyslipidemia -----    METS/Exercise Tolerance:     Hematologic:  - neg hematologic  ROS     Musculoskeletal:  - neg musculoskeletal ROS     GI/Hepatic:     (+) GERD, Symptomatic, bowel prep,     Renal/Genitourinary:  - neg Renal ROS     Endo:     (+) Obesity,     Psychiatric/Substance Use:     (+) psychiatric history anxiety     Infectious Disease:  - neg infectious disease ROS     Malignancy:  - neg malignancy ROS     Other:            Physical Exam    Airway  airway exam normal      Mallampati: II   TM distance: > 3 FB   Neck ROM: full   Mouth opening: > 3 cm    Respiratory Devices and Support         Dental           Cardiovascular   cardiovascular exam normal          Pulmonary   pulmonary exam normal                OUTSIDE LABS:  CBC:   Lab Results   Component Value Date    WBC 7.0 2019    WBC 7.9 2016     HGB 13.8 07/31/2019    HGB 13.0 02/26/2016    HCT 40.9 07/31/2019    HCT 38.6 02/26/2016     07/31/2019     02/26/2016     BMP:   Lab Results   Component Value Date     05/27/2021     07/31/2019    POTASSIUM 4.1 05/27/2021    POTASSIUM 3.7 07/31/2019    CHLORIDE 103 05/27/2021    CHLORIDE 106 07/31/2019    CO2 27 05/27/2021    CO2 26 07/31/2019    BUN 10 05/27/2021    BUN 9 07/31/2019    CR 0.90 05/27/2021    CR 0.89 07/31/2019    GLC 88 05/27/2021     (H) 07/31/2019     COAGS: No results found for: PTT, INR, FIBR  POC: No results found for: BGM, HCG, HCGS  HEPATIC:   Lab Results   Component Value Date    ALBUMIN 3.8 05/27/2021    PROTTOTAL 7.3 05/27/2021    ALT 38 05/27/2021    AST 20 05/27/2021    ALKPHOS 41 05/27/2021    BILITOTAL 0.3 05/27/2021     OTHER:   Lab Results   Component Value Date    PH 6.0 02/04/2008    MARIA ISABEL 8.8 05/27/2021    LIPASE 131 02/26/2016    AMYLASE 57 02/26/2016    TSH 1.09 05/27/2021       Anesthesia Plan    ASA Status:  2   NPO Status:  NPO Appropriate    Anesthesia Type: MAC.     - Reason for MAC: straight local not clinically adequate   Induction: Intravenous, Propofol.   Maintenance: TIVA.        Consents    Anesthesia Plan(s) and associated risks, benefits, and realistic alternatives discussed. Questions answered and patient/representative(s) expressed understanding.     - Discussed with:  Patient      - Extended Intubation/Ventilatory Support Discussed: No.      - Patient is DNR/DNI Status: No    Use of blood products discussed: No .     Postoperative Care            Comments:    The risks and benefits of anesthesia, and the alternatives where applicable, have been discussed with the patient, and they wish to proceed.  H and P by Dr Evangelista prior to anesthetic            KIMBERLY Phillips CRNA

## 2021-09-13 ENCOUNTER — OFFICE VISIT (OUTPATIENT)
Dept: FAMILY MEDICINE | Facility: CLINIC | Age: 41
End: 2021-09-13
Payer: COMMERCIAL

## 2021-09-13 VITALS
HEART RATE: 78 BPM | TEMPERATURE: 98.7 F | DIASTOLIC BLOOD PRESSURE: 84 MMHG | OXYGEN SATURATION: 98 % | RESPIRATION RATE: 16 BRPM | SYSTOLIC BLOOD PRESSURE: 124 MMHG | BODY MASS INDEX: 46.55 KG/M2 | WEIGHT: 273.3 LBS

## 2021-09-13 DIAGNOSIS — F41.0 PANIC ATTACK: ICD-10-CM

## 2021-09-13 DIAGNOSIS — R03.0 ELEVATED BLOOD PRESSURE READING WITHOUT DIAGNOSIS OF HYPERTENSION: ICD-10-CM

## 2021-09-13 DIAGNOSIS — Z12.4 SCREENING FOR MALIGNANT NEOPLASM OF CERVIX: Primary | ICD-10-CM

## 2021-09-13 DIAGNOSIS — K21.00 GASTROESOPHAGEAL REFLUX DISEASE WITH ESOPHAGITIS, UNSPECIFIED WHETHER HEMORRHAGE: ICD-10-CM

## 2021-09-13 DIAGNOSIS — M79.89 LOCALIZED SWELLING OF BOTH LOWER EXTREMITIES: ICD-10-CM

## 2021-09-13 DIAGNOSIS — F41.1 GAD (GENERALIZED ANXIETY DISORDER): ICD-10-CM

## 2021-09-13 DIAGNOSIS — Z12.31 ENCOUNTER FOR SCREENING MAMMOGRAM FOR BREAST CANCER: ICD-10-CM

## 2021-09-13 DIAGNOSIS — Z80.0 FAMILY HISTORY OF COLON CANCER: ICD-10-CM

## 2021-09-13 PROCEDURE — 87624 HPV HI-RISK TYP POOLED RSLT: CPT | Performed by: STUDENT IN AN ORGANIZED HEALTH CARE EDUCATION/TRAINING PROGRAM

## 2021-09-13 PROCEDURE — G0145 SCR C/V CYTO,THINLAYER,RESCR: HCPCS | Performed by: STUDENT IN AN ORGANIZED HEALTH CARE EDUCATION/TRAINING PROGRAM

## 2021-09-13 PROCEDURE — 99214 OFFICE O/P EST MOD 30 MIN: CPT | Performed by: STUDENT IN AN ORGANIZED HEALTH CARE EDUCATION/TRAINING PROGRAM

## 2021-09-13 RX ORDER — PROPRANOLOL HYDROCHLORIDE 20 MG/1
20 TABLET ORAL DAILY
Qty: 90 TABLET | Refills: 3 | Status: SHIPPED | OUTPATIENT
Start: 2021-09-13 | End: 2022-09-12

## 2021-09-13 RX ORDER — HYDROXYZINE HYDROCHLORIDE 25 MG/1
TABLET, FILM COATED ORAL
Qty: 30 TABLET | Refills: 3 | Status: SHIPPED | OUTPATIENT
Start: 2021-09-13 | End: 2021-09-13

## 2021-09-13 RX ORDER — FUROSEMIDE 20 MG
20 TABLET ORAL DAILY PRN
Qty: 30 TABLET | Refills: 5 | Status: SHIPPED | OUTPATIENT
Start: 2021-09-13 | End: 2021-12-06

## 2021-09-13 RX ORDER — CITALOPRAM HYDROBROMIDE 10 MG/1
10 TABLET ORAL DAILY
Qty: 90 TABLET | Refills: 3 | Status: SHIPPED | OUTPATIENT
Start: 2021-09-13 | End: 2022-09-12

## 2021-09-13 NOTE — PROGRESS NOTES
Assessment & Plan     Screening for malignant neoplasm of cervix  - Pap screen with HPV - recommended age 30 - 65 years    BLAINE (generalized anxiety disorder)  Stable. Continue current medication(s) and dose(s).   - citalopram (CELEXA) 10 MG tablet; Take 1 tablet (10 mg) by mouth daily  - propranolol (INDERAL) 20 MG tablet; Take 1 tablet (20 mg) by mouth daily    Panic attack  Stable. Continue current medication(s) and dose(s).   - citalopram (CELEXA) 10 MG tablet; Take 1 tablet (10 mg) by mouth daily  - propranolol (INDERAL) 20 MG tablet; Take 1 tablet (20 mg) by mouth daily    Elevated blood pressure reading without diagnosis of hypertension  Stable. Continue current medication(s) and dose(s).   - propranolol (INDERAL) 20 MG tablet; Take 1 tablet (20 mg) by mouth daily    Family history of colon cancer  - omeprazole (PRILOSEC) 20 MG DR capsule; Take 1 capsule (20 mg) by mouth daily    Gastroesophageal reflux disease with esophagitis, unspecified whether hemorrhage  Stable. Continue current medication(s) and dose(s).   - omeprazole (PRILOSEC) 20 MG DR capsule; Take 1 capsule (20 mg) by mouth daily    Localized swelling of both lower extremities  Has noted swelling when she starts ovulate that continues through her.  And she would like to have a medication to help her get rid of the extra fluid.  I sent in a prescription for furosemide and instructed her on the days that she takes it to be sure to increase potassium in her diet.  - furosemide (LASIX) 20 MG tablet; Take 1 tablet (20 mg) by mouth daily as needed (swelling)    Encounter for screening mammogram for breast cancer  - *MA Screening Digital Bilateral; Future       No follow-ups on file.    KIMBERLY Onofre CNP  [facility] VILMA Sanchez is a 40 year old who presents for the following health issues     HPI     Chief Complaint   Patient presents with     Gyn Exam     PAP     Recheck Medication         Review of  Systems   Constitutional, HEENT, cardiovascular, pulmonary, gi and gu systems are negative, except as otherwise noted.      Objective    /84   Pulse 78   Temp 98.7  F (37.1  C) (Temporal)   Resp 16   Wt 124 kg (273 lb 4.8 oz)   SpO2 98%   BMI 46.55 kg/m    Body mass index is 46.55 kg/m .  Physical Exam   GENERAL: healthy, alert and no distress   (female): normal female external genitalia, normal urethral meatus, vaginal mucosa, normal cervix/adnexa/uterus without masses or discharge  MS: no gross musculoskeletal defects noted, no edema  SKIN: no suspicious lesions or rashes  NEURO: Normal strength and tone, mentation intact and speech normal  PSYCH: mentation appears normal, affect normal/bright

## 2021-09-13 NOTE — PATIENT INSTRUCTIONS
Prescription for furosemide 20 mg once daily in the morning to use as needed for leg swelling around the time of your period. Be sure to increase potassium in your diet on the days you take furosemide as this medication will lower potassium.    Marsha Kirby, CNP

## 2021-09-16 ENCOUNTER — ANCILLARY PROCEDURE (OUTPATIENT)
Dept: MAMMOGRAPHY | Facility: OTHER | Age: 41
End: 2021-09-16
Attending: STUDENT IN AN ORGANIZED HEALTH CARE EDUCATION/TRAINING PROGRAM
Payer: COMMERCIAL

## 2021-09-16 DIAGNOSIS — Z12.31 ENCOUNTER FOR SCREENING MAMMOGRAM FOR BREAST CANCER: ICD-10-CM

## 2021-09-16 LAB
BKR LAB AP GYN ADEQUACY: NORMAL
BKR LAB AP GYN INTERPRETATION: NORMAL
BKR LAB AP HPV REFLEX: NORMAL
BKR LAB AP PREVIOUS ABNL DX: NORMAL
BKR LAB AP PREVIOUS ABNORMAL: NORMAL
PATH REPORT.COMMENTS IMP SPEC: NORMAL
PATH REPORT.RELEVANT HX SPEC: NORMAL

## 2021-09-16 PROCEDURE — 77067 SCR MAMMO BI INCL CAD: CPT | Mod: TC | Performed by: RADIOLOGY

## 2021-09-17 ENCOUNTER — MYC MEDICAL ADVICE (OUTPATIENT)
Dept: FAMILY MEDICINE | Facility: CLINIC | Age: 41
End: 2021-09-17

## 2021-09-20 ENCOUNTER — PATIENT OUTREACH (OUTPATIENT)
Dept: FAMILY MEDICINE | Facility: CLINIC | Age: 41
End: 2021-09-20

## 2021-09-20 DIAGNOSIS — R87.810 CERVICAL HIGH RISK HPV (HUMAN PAPILLOMAVIRUS) TEST POSITIVE: ICD-10-CM

## 2021-09-20 LAB
HUMAN PAPILLOMA VIRUS 16 DNA: NEGATIVE
HUMAN PAPILLOMA VIRUS 18 DNA: NEGATIVE
HUMAN PAPILLOMA VIRUS FINAL DIAGNOSIS: NORMAL
HUMAN PAPILLOMA VIRUS OTHER HR: NEGATIVE

## 2021-09-20 NOTE — RESULT ENCOUNTER NOTE
Normal pap/Neg HPV letter sent through [employer] results. Next pap smear and HPV due in 1 year.     Michelle Lockhart, RN, BSN  Pap Tracking Nurse

## 2021-09-25 ENCOUNTER — HEALTH MAINTENANCE LETTER (OUTPATIENT)
Age: 41
End: 2021-09-25

## 2021-12-01 ENCOUNTER — MYC MEDICAL ADVICE (OUTPATIENT)
Dept: FAMILY MEDICINE | Facility: CLINIC | Age: 41
End: 2021-12-01
Payer: COMMERCIAL

## 2021-12-01 NOTE — TELEPHONE ENCOUNTER
Attempted to call patient, no answer.     [employer] message sent to patient.     RADHA GodoyN, RN  [facility]

## 2021-12-01 NOTE — TELEPHONE ENCOUNTER
Spoke to patient. She needs to be seen for possible sinus infection. Patient is scheduled tomorrow.    RADHA GodoyN, RN  [facility]

## 2021-12-06 ENCOUNTER — VIRTUAL VISIT (OUTPATIENT)
Dept: FAMILY MEDICINE | Facility: CLINIC | Age: 41
End: 2021-12-06
Payer: COMMERCIAL

## 2021-12-06 DIAGNOSIS — B96.89 ACUTE BACTERIAL SINUSITIS: Primary | ICD-10-CM

## 2021-12-06 DIAGNOSIS — J01.90 ACUTE BACTERIAL SINUSITIS: Primary | ICD-10-CM

## 2021-12-06 PROCEDURE — 99213 OFFICE O/P EST LOW 20 MIN: CPT | Mod: GT | Performed by: FAMILY MEDICINE

## 2021-12-06 NOTE — PROGRESS NOTES
Laura is a 41 year old who is being evaluated via a billable video visit.      How would you like to obtain your AVS? MyChart  If the video visit is dropped, the invitation should be resent by: Text to cell phone: [phone]  Will anyone else be joining your video visit? No      Video Start Time: 3:25    1. Acute bacterial sinusitis  Laura has signs and symptoms consistent with acute bacterial sinus infection for greater than 10 days.  We will send a prescription for Augmentin.  We will have her use Flonase nasal spray.  Should consider Orange pot or nasal saline rinses.  Follow-up with PCP in 2 weeks if not improving.  - amoxicillin-clavulanate (AUGMENTIN) 875-125 MG tablet; Take 1 tablet by mouth 2 times daily for 7 days  Dispense: 14 tablet; Refill: 0      Subjective   Laura is a 41 year old who presents for the following health issues   HPI   10 initially was scheduled for virtual visit.  However, due to technical difficulties this was converted to a telephone encounter.  She states that she had a cold about 2 weeks ago that was mainly some nasal congestion.  That seemed to improve and then she started to have some sinus pain and pressure across her cheek she has had upper tooth pain.  She has had pain between her eyes.  She has not had a fever.  She is never had a sinus infection.  She has been using some Sudafed which has been helpful.  She seems to have some seasonal allergies as well.        Objective           Vitals:  No vitals were obtained today due to virtual visit.    Physical Exam   Unable due to telephone encounter    Video-Visit Details    Type of service:  Video Visit    Video End Time:3:30 PM    Originating Location (pt. Location): Home    Distant Location (provider location):  [facility]     Platform used for Video Visit: Unable to complete video visit

## 2021-12-06 NOTE — PATIENT INSTRUCTIONS
Sinusitis (Antibiotic Treatment)    The sinuses are air-filled spaces within the bones of the face. They connect to the inside of the nose. Sinusitis is an inflammation of the tissue that lines the sinuses. Sinusitis can occur during a cold. It can also happen due to allergies to pollens and other particles in the air. Sinusitis can cause symptoms of sinus congestion and a feeling of fullness. A sinus infection causes fever, headache, and facial pain. There is often green or yellow fluid draining from the nose or into the back of the throat (post-nasal drip). You have been given antibiotics to treat this condition.   Home care    Take the full course of antibiotics as instructed. Don't stop taking them, even when you feel better.    Drink plenty of water, hot tea, and other liquids as directed by the healthcare provider. This may help thin nasal mucus. It also may help your sinuses drain fluids.    Heat may help soothe painful areas of your face. Use a towel soaked in hot water. Or,  the shower and direct the warm spray onto your face. Using a vaporizer along with a menthol rub at night may also help soothe symptoms.     An expectorant with guaifenesin may help thin nasal mucus and help your sinuses drain fluids. Talk with your provider or pharmacists before taking an over-the-counter (OTC) medicine if you have any questions about it or its side effects..    You can use an OTC decongestant, unless a similar medicine was prescribed to you. Nasal sprays work the fastest. Use one that contains phenylephrine or oxymetazoline. First blow your nose gently. Then use the spray. Don't use these medicines more often than directed on the label. If you do, your symptoms may get worse. You may also take pills that contain pseudoephedrine. Don t use products that combine multiple medicines. This is because side effects may be increased. Read labels. You can also ask the pharmacist for help. (People with high blood  pressure should not use decongestants. They can raise blood pressure.) Talk with your provider or pharmacist if you have any questions about the medicine..    OTC antihistamines may help if allergies contributed to your sinusitis. Talk with your provider or pharmacist if you have any questions about the medicine..    Don't use nasal rinses or irrigation during an acute sinus infection, unless your healthcare provider tells you to. Rinsing may spread the infection to other areas in your sinuses.    Use acetaminophen or ibuprofen to control pain, unless another pain medicine was prescribed to you. If you have chronic liver or kidney disease or ever had a stomach ulcer, talk with your healthcare provider before using these medicines. Never give aspirin to anyone under age 18 who is ill with a fever. It may cause severe liver damage.    Don't smoke. This can make symptoms worse.    Follow-up care  Follow up with your healthcare provider, or as advised.   When to seek medical advice  Call your healthcare provider if any of these occur:     Facial pain or headache that gets worse    Stiff neck    Unusual drowsiness or confusion    Swelling of your forehead or eyelids    Symptoms don't go away in 10 days    Vision problems, such as blurred or double vision    Fever of 100.4 F (38 C) or higher, or as directed by your healthcare provider  Call 911  Call 911 if any of these occur:     Seizure    Trouble breathing    Feeling dizzy or faint    Fingernails, skin or lips look blue, purple , or gray  Prevention  Here are steps you can take to help prevent an infection:     Keep good hand washing habits.    Don t have close contact with people who have sore throats, colds, or other upper respiratory infections.    Don t smoke, and stay away from secondhand smoke.    Stay up to date with of your vaccines.  [employer] last reviewed this educational content on 12/1/2019 2000-2021 [employer]. All rights reserved. This  information is not intended as a substitute for professional medical care. Always follow your healthcare professional's instructions.        Dear Laura Guajardo    After reviewing your responses, I've been able to diagnose you with?a sinus infection caused by bacteria.?     Based on your responses and diagnosis, I have prescribed augmentin to treat your symptoms. I have sent this to your pharmacy.?     It is also important to stay well hydrated, get lots of rest and take over-the-counter decongestants,?tylenol?or ibuprofen if you?are able to?take those medications per your primary care provider to help relieve discomfort.?     It is important that you take?all of?your prescribed medication even if your symptoms are improving after a few doses.? Taking?all of?your medicine helps prevent the symptoms from returning.?     If your symptoms worsen, you develop severe headache, vomiting, high fever (>102), or are not improving in 7 days, please contact your primary care provider for an appointment or visit any of our convenient Walk-in Care or Urgent Care Centers to be seen which can be found on our website?here.?     Thanks again for choosing?us?as your health care partner,?   ?  Lyudmila Priest?

## 2022-01-04 ASSESSMENT — ENCOUNTER SYMPTOMS
BREAST MASS: 0
DIARRHEA: 1
ABDOMINAL PAIN: 1

## 2022-01-05 ENCOUNTER — HOSPITAL ENCOUNTER (OUTPATIENT)
Dept: ULTRASOUND IMAGING | Facility: CLINIC | Age: 42
Discharge: HOME | End: 2022-01-05
Attending: NURSE PRACTITIONER | Admitting: NURSE PRACTITIONER
Payer: COMMERCIAL

## 2022-01-05 ENCOUNTER — OFFICE VISIT (OUTPATIENT)
Dept: FAMILY MEDICINE | Facility: CLINIC | Age: 42
End: 2022-01-05
Payer: COMMERCIAL

## 2022-01-05 VITALS
OXYGEN SATURATION: 100 % | WEIGHT: 274.5 LBS | RESPIRATION RATE: 18 BRPM | BODY MASS INDEX: 46.86 KG/M2 | SYSTOLIC BLOOD PRESSURE: 124 MMHG | HEART RATE: 87 BPM | HEIGHT: 64 IN | TEMPERATURE: 98.6 F | DIASTOLIC BLOOD PRESSURE: 80 MMHG

## 2022-01-05 DIAGNOSIS — R10.9 RIGHT FLANK PAIN: ICD-10-CM

## 2022-01-05 DIAGNOSIS — R10.11 RUQ ABDOMINAL PAIN: ICD-10-CM

## 2022-01-05 DIAGNOSIS — Z13.220 LIPID SCREENING: ICD-10-CM

## 2022-01-05 DIAGNOSIS — R93.89 ABNORMAL FINDING ON ULTRASOUND: ICD-10-CM

## 2022-01-05 DIAGNOSIS — R23.2 HOT FLASHES: ICD-10-CM

## 2022-01-05 DIAGNOSIS — R19.7 DIARRHEA, UNSPECIFIED TYPE: ICD-10-CM

## 2022-01-05 DIAGNOSIS — Z00.00 ROUTINE GENERAL MEDICAL EXAMINATION AT A HEALTH CARE FACILITY: Primary | ICD-10-CM

## 2022-01-05 DIAGNOSIS — R14.0 ABDOMINAL BLOATING: ICD-10-CM

## 2022-01-05 LAB
ALBUMIN SERPL-MCNC: 3.7 G/DL (ref 3.4–5)
ALP SERPL-CCNC: 43 U/L (ref 40–150)
ALT SERPL W P-5'-P-CCNC: 37 U/L (ref 0–50)
AMYLASE SERPL-CCNC: 40 U/L (ref 30–110)
ANION GAP SERPL CALCULATED.3IONS-SCNC: 6 MMOL/L (ref 3–14)
AST SERPL W P-5'-P-CCNC: 21 U/L (ref 0–45)
BASOPHILS # BLD AUTO: 0 10E3/UL (ref 0–0.2)
BASOPHILS NFR BLD AUTO: 1 %
BILIRUB SERPL-MCNC: 0.4 MG/DL (ref 0.2–1.3)
BUN SERPL-MCNC: 10 MG/DL (ref 7–30)
CALCIUM SERPL-MCNC: 8.7 MG/DL (ref 8.5–10.1)
CHLORIDE BLD-SCNC: 108 MMOL/L (ref 94–109)
CHOLEST SERPL-MCNC: 198 MG/DL
CO2 SERPL-SCNC: 27 MMOL/L (ref 20–32)
CREAT SERPL-MCNC: 0.79 MG/DL (ref 0.52–1.04)
EOSINOPHIL # BLD AUTO: 0.1 10E3/UL (ref 0–0.7)
EOSINOPHIL NFR BLD AUTO: 1 %
ERYTHROCYTE [DISTWIDTH] IN BLOOD BY AUTOMATED COUNT: 13.7 % (ref 10–15)
FASTING STATUS PATIENT QL REPORTED: YES
FSH SERPL-ACNC: 4.6 IU/L
GFR SERPL CREATININE-BSD FRML MDRD: >90 ML/MIN/1.73M2
GLUCOSE BLD-MCNC: 105 MG/DL (ref 70–99)
HCT VFR BLD AUTO: 40.9 % (ref 35–47)
HDLC SERPL-MCNC: 43 MG/DL
HGB BLD-MCNC: 13.4 G/DL (ref 11.7–15.7)
IMM GRANULOCYTES # BLD: 0 10E3/UL
IMM GRANULOCYTES NFR BLD: 0 %
LDLC SERPL CALC-MCNC: 99 MG/DL
LIPASE SERPL-CCNC: 63 U/L (ref 73–393)
LYMPHOCYTES # BLD AUTO: 2.4 10E3/UL (ref 0.8–5.3)
LYMPHOCYTES NFR BLD AUTO: 30 %
MCH RBC QN AUTO: 30.3 PG (ref 26.5–33)
MCHC RBC AUTO-ENTMCNC: 32.8 G/DL (ref 31.5–36.5)
MCV RBC AUTO: 93 FL (ref 78–100)
MONOCYTES # BLD AUTO: 0.5 10E3/UL (ref 0–1.3)
MONOCYTES NFR BLD AUTO: 6 %
NEUTROPHILS # BLD AUTO: 5 10E3/UL (ref 1.6–8.3)
NEUTROPHILS NFR BLD AUTO: 62 %
NONHDLC SERPL-MCNC: 155 MG/DL
NRBC # BLD AUTO: 0 10E3/UL
NRBC BLD AUTO-RTO: 0 /100
PLATELET # BLD AUTO: 321 10E3/UL (ref 150–450)
POTASSIUM BLD-SCNC: 3.9 MMOL/L (ref 3.4–5.3)
PROT SERPL-MCNC: 7.4 G/DL (ref 6.8–8.8)
RBC # BLD AUTO: 4.42 10E6/UL (ref 3.8–5.2)
SODIUM SERPL-SCNC: 141 MMOL/L (ref 133–144)
TRIGL SERPL-MCNC: 278 MG/DL
WBC # BLD AUTO: 8.1 10E3/UL (ref 4–11)

## 2022-01-05 PROCEDURE — 99214 OFFICE O/P EST MOD 30 MIN: CPT | Mod: 25 | Performed by: NURSE PRACTITIONER

## 2022-01-05 PROCEDURE — 83690 ASSAY OF LIPASE: CPT | Performed by: NURSE PRACTITIONER

## 2022-01-05 PROCEDURE — 36415 COLL VENOUS BLD VENIPUNCTURE: CPT | Performed by: NURSE PRACTITIONER

## 2022-01-05 PROCEDURE — 80061 LIPID PANEL: CPT | Performed by: NURSE PRACTITIONER

## 2022-01-05 PROCEDURE — 85025 COMPLETE CBC W/AUTO DIFF WBC: CPT | Performed by: NURSE PRACTITIONER

## 2022-01-05 PROCEDURE — 83001 ASSAY OF GONADOTROPIN (FSH): CPT | Performed by: NURSE PRACTITIONER

## 2022-01-05 PROCEDURE — 76705 ECHO EXAM OF ABDOMEN: CPT

## 2022-01-05 PROCEDURE — 82150 ASSAY OF AMYLASE: CPT | Performed by: NURSE PRACTITIONER

## 2022-01-05 PROCEDURE — 80053 COMPREHEN METABOLIC PANEL: CPT | Performed by: NURSE PRACTITIONER

## 2022-01-05 PROCEDURE — 99396 PREV VISIT EST AGE 40-64: CPT | Performed by: NURSE PRACTITIONER

## 2022-01-05 ASSESSMENT — ENCOUNTER SYMPTOMS
ABDOMINAL PAIN: 1
DIARRHEA: 1
BREAST MASS: 0

## 2022-01-05 ASSESSMENT — PAIN SCALES - GENERAL: PAINLEVEL: NO PAIN (0)

## 2022-01-05 ASSESSMENT — MIFFLIN-ST. JEOR: SCORE: 1901.47

## 2022-01-05 NOTE — PROGRESS NOTES
SUBJECTIVE:   CC: Laura Guajardo is an 41 year old woman who presents for preventive health visit.       Patient has been advised of split billing requirements and indicates understanding: Yes     Patient would also like to establish care with you  Healthy Habits:     Getting at least 3 servings of Calcium per day:  Yes    Bi-annual eye exam:  NO    Dental care twice a year:  Yes    Sleep apnea or symptoms of sleep apnea:  Excessive snoring    Diet:  Regular (no restrictions)    Frequency of exercise:  None    Taking medications regularly:  Yes    Medication side effects:  None    PHQ-2 Total Score: 0    Additional concerns today:  Yes      Patient c/o symptoms of gassiness and bloating, will have flatulence and diarrhea, pain radiates to RUQ posterior. Symptoms are occurring daily. Symptoms started over a month ago.   Diet consist of morning espresso/adolfo, breakfast eggs (3 boiled with oatmeal and or oranges) and water throughout day. Lunch consist of sandwich soup and or out, dinner mostly meat and vegi. Last meal is typically 6 pm or later. No snack prior to bed. No change in diet since symptoms started.     Symptoms are improved without food. Symptoms worsen throughout the day. Symptoms improve when lying down. BM daily usually after breakfast and has been diarrhea; negative for blood, brown or light.   Occasional alcohol not daily.       Feeling of hormone changes. She has regular menses, sometimes has pain or achy with ovulation. Has had a tubal. Denies dyspareunia. Having hot flashes inconsistently during day not at night.           Today's PHQ-2 Score:   PHQ-2 ( 1999 Pf[employer]) 1/4/2022   Q1: Little interest or pleasure in doing things 0   Q2: Feeling down, depressed or hopeless 0   PHQ-2 Score 0   PHQ-2 Total Score (12-17 Years)- Positive if 3 or more points; Administer PHQ-A if positive -   Q1: Little interest or pleasure in doing things Not at all   Q2: Feeling down, depressed or hopeless Not at all    PHQ-2 Score 0       Abuse: Current or Past (Physical, Sexual or Emotional) - No  Do you feel safe in your environment? Yes    Have you ever done Advance Care Planning? (For example, a Health Directive, POLST, or a discussion with a medical provider or your loved ones about your wishes): No, advance care planning information given to patient to review.  Patient declined advance care planning discussion at this time.    Social History     Tobacco Use     Smoking status: Former Smoker     Packs/day: 0.50     Years: 5.00     Pack years: 2.50     Types: Cigarettes     Smokeless tobacco: Never Used   Substance Use Topics     Alcohol use: Yes     Comment: 0-2 per week      If you drink alcohol do you typically have >3 drinks per day or >7 drinks per week? No    Alcohol Use 1/4/2022   Prescreen: >3 drinks/day or >7 drinks/week? No   Prescreen: >3 drinks/day or >7 drinks/week? -       Reviewed orders with patient.  Reviewed health maintenance and updated orders accordingly - Yes  Labs reviewed in EPIC  BP Readings from Last 3 Encounters:   01/05/22 124/80   09/13/21 124/84   07/27/21 132/88    Wt Readings from Last 3 Encounters:   01/05/22 124.5 kg (274 lb 8 oz)   09/13/21 124 kg (273 lb 4.8 oz)   07/22/21 118.4 kg (261 lb)                  Patient Active Problem List   Diagnosis     CARDIOVASCULAR SCREENING; LDL GOAL LESS THAN 160     Morbid obesity (H)     Cervical high risk HPV (human papillomavirus) test positive     Pelvic cramping     Urinary urgency     Dysuria     Hair loss     Dry skin     Past Surgical History:   Procedure Laterality Date     COLONOSCOPY N/A 7/27/2021    Procedure: COLONOSCOPY;  Surgeon: Jose Evangelista DO;  Location:  GI     ESOPHAGOSCOPY, GASTROSCOPY, DUODENOSCOPY (EGD), COMBINED N/A 7/27/2021    Procedure: ESOPHAGOGASTRODUODENOSCOPY (EGD);  Surgeon: Jose Evangelista DO;  Location:  GI     TUBAL LIGATION  2006       Social History     Tobacco Use     Smoking status: Former  Smoker     Packs/day: 0.50     Years: 5.00     Pack years: 2.50     Types: Cigarettes     Smokeless tobacco: Never Used   Substance Use Topics     Alcohol use: Yes     Comment: 0-2 per week      Family History   Problem Relation Age of Onset     Cancer Maternal Aunt      Hypertension Father      Alcohol/Drug Father         alcohol     Heart Disease Father 52        MI     Psychotic Disorder Sister         depression,anxiety     Psychotic Disorder Sister         depression, anxiety     Stomach Problem Sister      Alcohol/Drug Brother         alcohol and drugs     Psychotic Disorder Brother         depression     Cancer - colorectal Paternal Grandmother      Cancer Paternal Grandmother      Colon Cancer Paternal Grandmother      Cancer - colorectal Paternal Grandfather      Cancer Paternal Grandfather      Colon Cancer Paternal Grandfather      Aneurysm Mother 59        abdominal     Heart Disease Maternal Grandfather          Current Outpatient Medications   Medication Sig Dispense Refill     citalopram (CELEXA) 10 MG tablet Take 1 tablet (10 mg) by mouth daily 90 tablet 3     omeprazole (PRILOSEC) 20 MG DR capsule Take 1 capsule (20 mg) by mouth daily 90 capsule 3     propranolol (INDERAL) 20 MG tablet Take 1 tablet (20 mg) by mouth daily 90 tablet 3     No Known Allergies  Recent Labs   Lab Test 05/27/21  1628 07/31/19  2305 07/10/19  1848 02/26/16  1539 11/08/13  0922   LDL  --   --   --   --  99   HDL  --   --   --   --  50   TRIG  --   --   --   --  90   ALT 38 42 57*   < >  --    CR 0.90 0.89 0.79   < >  --    GFRESTIMATED 79 82 >90   < >  --    GFRESTBLACK >90 >90 >90   < >  --    POTASSIUM 4.1 3.7 4.1   < >  --    TSH 1.09 2.10  --   --   --     < > = values in this interval not displayed.        Breast Cancer Screening:    FHS-7:   Breast CA Risk Assessment (FHS-7) 9/16/2021 1/4/2022   Did any of your first-degree relatives have breast or ovarian cancer? No No   Did any of your relatives have bilateral  breast cancer? No No   Did any man in your family have breast cancer? No No   Did any woman in your family have breast and ovarian cancer? No No   Did any woman in your family have breast cancer before age 50 y? No No   Do you have 2 or more relatives with breast and/or ovarian cancer? No No   Do you have 2 or more relatives with breast and/or bowel cancer? Yes No       Mammogram Screening - Offered annual screening and updated Health Maintenance for mutual plan based on risk factor consideration    Pertinent mammograms are reviewed under the imaging tab.    History of abnormal Pap smear:   NO - age 30-65 PAP every 5 years with negative HPV co-testing recommended  Last 3 Pap Results:   PAP (no units)   Date Value   2020 NIL   2013 NIL     PAP / HPV Latest Ref Rng & Units 2021   PAP   Negative for Intraepithelial Lesion or Malignancy (NILM) - -   PAP (Historical) - - NIL NIL   HPV16 Negative Negative Negative -   HPV18 Negative Negative Negative -   HRHPV Negative Negative Positive(A) -     Reviewed and updated as needed this visit by clinical staff  Tobacco  Allergies  Meds   Med Hx  Surg Hx  Fam Hx  Soc Hx       Reviewed and updated as needed this visit by Provider               Past Medical History:   Diagnosis Date     Anxiety      Cervical high risk HPV (human papillomavirus) test positive 2020    See problem list      Past Surgical History:   Procedure Laterality Date     COLONOSCOPY N/A 2021    Procedure: COLONOSCOPY;  Surgeon: Jose Evangelista DO;  Location:  GI     ESOPHAGOSCOPY, GASTROSCOPY, DUODENOSCOPY (EGD), COMBINED N/A 2021    Procedure: ESOPHAGOGASTRODUODENOSCOPY (EGD);  Surgeon: Jose Evangelista DO;  Location:  GI     TUBAL LIGATION       OB History    Para Term  AB Living   2 2 2 0 0 0   SAB IAB Ectopic Multiple Live Births   0 0 0 0 0      # Outcome Date GA Lbr Saleem/2nd Weight Sex Delivery Anes PTL Lv   2  "Term     M -SEC         Name: Eron Hinojosa Term     F -SEC         Name: Kelsey       Review of Systems   Cardiovascular: Positive for peripheral edema.   Gastrointestinal: Positive for abdominal pain and diarrhea.   Breasts:  Negative for tenderness, breast mass and discharge.   Genitourinary: Negative for pelvic pain, vaginal bleeding and vaginal discharge.          OBJECTIVE:   /80   Pulse 87   Temp 98.6  F (37  C) (Temporal)   Resp 18   Ht 1.636 m (5' 4.4\")   Wt 124.5 kg (274 lb 8 oz)   LMP 2021   SpO2 100%   Breastfeeding No   BMI 46.53 kg/m    Physical Exam  GENERAL: healthy, alert and no distress  EYES: Eyes grossly normal to inspection, PERRL and conjunctivae and sclerae normal  HENT: ear canals and TM's normal, nose and mouth without ulcers or lesions  NECK: no adenopathy, no asymmetry, masses, or scars and thyroid normal to palpation  RESP: lungs clear to auscultation - no rales, rhonchi or wheezes  CV: regular rate and rhythm, normal S1 S2, no S3 or S4, no murmur, click or rub, no peripheral edema and peripheral pulses strong  ABDOMEN: tenderness epigastric and RUQ radiates to back. no organomegaly or masses, liver span normal to percussion, bowel sounds normal, no palpable or pulsatile masses, no bruits heard, no palpable renal abnormalities  and no scars, striae, dilated veins, rashes, or lesions  MS: no gross musculoskeletal defects noted, no edema  SKIN: no suspicious lesions or rashes  NEURO: Normal strength and tone, mentation intact and speech normal  PSYCH: mentation appears normal, affect normal/bright        ASSESSMENT/PLAN:   (Z00.00) Routine general medical examination at a health care facility  (primary encounter diagnosis)  Reviewed recommended screenings and ordered appropriate testing for pt's risks and per pt's request(s).     Plan: Lipid panel reflex to direct LDL Fasting         (R10.11) RUQ abdominal pain  Comment: Discussed with patient " recommendation for right upper quadrant ultrasound due to pain in this area would like to assess liver and gallbladder discussed that she may need to have a CAT scan done if this is not conclusive.  Labs as ordered differential diagnosis includes cholecystitis, hepatitis, pancreatitis, GERD.  We will continue proton pump inhibitor daily as she has been.  Plan: Comprehensive metabolic panel (BMP + Alb, Alk         Phos, ALT, AST, Total. Bili, TP), US Abdomen         Limited, CBC with platelets and differential,         Lipase, Amylase            (R14.0) Abdominal bloating  Comment:   Plan: Comprehensive metabolic panel (BMP + Alb, Alk         Phos, ALT, AST, Total. Bili, TP), US Abdomen         Limited, CBC with platelets and differential,         Lipase, Amylase            (R10.9) Right flank pain  Comment:   Plan: Comprehensive metabolic panel (BMP + Alb, Alk         Phos, ALT, AST, Total. Bili, TP), US Abdomen         Limited, CBC with platelets and differential,         Lipase, Amylase            (R19.7) Diarrhea, unspecified type  Comment:   Plan: US Abdomen Limited, CBC with platelets and         differential            (R23.2) Hot flashes  Comment: We will check a follicle-stimulating hormone today she seems to be in premenopausal state.  With symptoms being vague would recommend that she follow-up with OB/GYN for further evaluation.  Plan: Follicle stimulating hormone, Ob/Gyn Referral            (Z13.220) Lipid screening  Comment:   Plan: Lipid panel reflex to direct LDL Fasting              Patient has been advised of split billing requirements and indicates understanding: Yes  COUNSELING:  Reviewed preventive health counseling, as reflected in patient instructions       Regular exercise       Healthy diet/nutrition       Vision screening       Immunizations    Declined: Influenza due to Other                Alcohol Use       Osteoporosis prevention/bone health       Consider Hep C screening for all patients  "one time for ages 18-79 years    Estimated body mass index is 46.53 kg/m  as calculated from the following:    Height as of this encounter: 1.636 m (5' 4.4\").    Weight as of this encounter: 124.5 kg (274 lb 8 oz).    Weight management plan: Discussed healthy diet and exercise guidelines    She reports that she has quit smoking. Her smoking use included cigarettes. She has a 2.50 pack-year smoking history. She has never used smokeless tobacco.      Counseling Resources:  ATP IV Guidelines  Pooled Cohorts Equation Calculator  Breast Cancer Risk Calculator  BRCA-Related Cancer Risk Assessment: FHS-7 Tool  FRAX Risk Assessment  ICSI Preventive Guidelines  Dietary Guidelines for Americans, 2010  USDA's MyPlate  ASA Prophylaxis  Lung CA Screening    [facility]  "

## 2022-01-05 NOTE — RESULT ENCOUNTER NOTE
I spoke with patient in regards to labs including cholesterol panel which indicated a elevation of triglycerides and lowering of HDL discussed recommendation for omega-3 fatty acids.  And dietary changes along with lifestyle changes.  Complete metabolic panel was also discussed with mild elevation of glucose CBC was within normal range amylase lipase levels lipase is slightly low.  Discussed imaging as well concern for possible underlying fibrosis of liver and current symptoms of right upper quadrant right posterior flank area pain with fullness and bloating.  Recommend patient follow-up with gastroenterology for further evaluation referral has been placed for this and patient will call on her own to schedule.  Patient verbalized understanding  Thank you  Karoline Niño CNP

## 2022-01-10 ENCOUNTER — OFFICE VISIT (OUTPATIENT)
Dept: GASTROENTEROLOGY | Facility: CLINIC | Age: 42
End: 2022-01-10
Attending: NURSE PRACTITIONER
Payer: COMMERCIAL

## 2022-01-10 VITALS
HEIGHT: 64 IN | TEMPERATURE: 98.3 F | DIASTOLIC BLOOD PRESSURE: 86 MMHG | SYSTOLIC BLOOD PRESSURE: 136 MMHG | WEIGHT: 266.6 LBS | BODY MASS INDEX: 45.52 KG/M2

## 2022-01-10 DIAGNOSIS — R10.9 RIGHT FLANK PAIN: ICD-10-CM

## 2022-01-10 DIAGNOSIS — R10.11 RUQ ABDOMINAL PAIN: ICD-10-CM

## 2022-01-10 DIAGNOSIS — R93.89 ABNORMAL FINDING ON ULTRASOUND: ICD-10-CM

## 2022-01-10 DIAGNOSIS — R19.7 DIARRHEA, UNSPECIFIED TYPE: ICD-10-CM

## 2022-01-10 DIAGNOSIS — R14.0 ABDOMINAL BLOATING: ICD-10-CM

## 2022-01-10 PROCEDURE — 99203 OFFICE O/P NEW LOW 30 MIN: CPT | Performed by: INTERNAL MEDICINE

## 2022-01-10 RX ORDER — AMOXICILLIN 500 MG
1200 CAPSULE ORAL DAILY
COMMUNITY

## 2022-01-10 ASSESSMENT — PAIN SCALES - GENERAL: PAINLEVEL: NO PAIN (0)

## 2022-01-10 ASSESSMENT — MIFFLIN-ST. JEOR: SCORE: 1865.64

## 2022-01-10 NOTE — PROGRESS NOTES
Dictated.    A 41-year-old female presents for evaluation of GI symptoms.  Patient was at her usual baseline health until  when she developed a sense of regurgitation which has resolved with the use of omeprazole daily.  This PPI is taking correctly in regard to timing before food it does not bother her and she is comfortable remaining on the medication.  There are no alarm features.  Patient underwent EGD on  with a colonoscopy and that both of those exams were normal.  Second, during that time over the summer she had a sense of bloating associated with looser stools which has since resolved with modification of her diet.  She has increased fruits and vegetables and has added fish oil for her health as well as almond milk and symptoms have improved stools are daily formed and nonconcerning.  Third, 4 weeks ago she developed right flank pain associated with a sense of spasm which would radiate to her costal margin.  This too has resolved as part of her evaluation she underwent right upper quadrant ultrasound the raised questions of the liver parenchyma.  Nevertheless, AST ALT were 37 and 21 respectively there is no stones or biliary dilatation by ultrasound.  BMI is 45.20.  Patient was advised to pay attention to triglycerides and she has a future visit to follow-up on this.    Past medical history is remarkable for history of dysuria pelvic cramping urinary urgency obesity history of HPV test.  Positivity.    [facility]    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  Habits no no tobacco episodic alcohol over the weekends.  The remainder of the review of systems is negative noncontributory.    Family history 2 grandparents with colon cancer in the 60s Sister has a polyp unknown type.  Mother  of a AAA.    Review of systems otherwise negative noncontributory.    On exam no acute distress.  [place] 878[address] pulse 8 7    HEENT unremarkable cardiac  S1-S2 without murmur rub or gallop    Lungs clear throughout abdomen limited by habitus soft nontender without organomegaly no percussion tenderness over the costal margin or the thoracic spine.    Extremities no edema no evidence of chronic liver disease.    Laboratory studies amylase 40.  Lipase 6 1.  Platelet count [id number] hemoglobin 13.4.  Electrolytes normal alk phos previously 41 total bili 0.4 ALT 37 AST 21.    Imaging reviewed ultrasound report.    Impression    Abdominal bloating which has resolved with dietary modification this may also have represented a acute self-limited infectious etiology as part of the differential diagnosis.  Stools are now daily and formed and colonoscopy is reassuringly normal as of last July.  Consider elements of nonulcer dyspepsia as well however patient is improved on omeprazole and dietary modification.    Obesity abnormal ultrasound with normal liver function testing.  Certainly, a fatty liver approach may be beneficial with diet exercise and goal of weight loss 5 to 10% discussed.  2 cups of coffee daily is a suggestion from [facility] and may be reasonable if this does not aggravate her reflux.  Patient will follow up with her provider regarding elevated triglycerides but the approach to this would be also similar with diet and weight loss.    Brief discussion of cryptogenic cirrhosis and fatty liver and approaches to this.  There is no medications currently indicated for fatty liver however given just the ultrasound finding with normal liver function tests this is reassuring and at this time there is no indirect evidence of inflammation but given the BMI there is probably probable fat in the liver as well that may have been appreciated by the ultrasound.    Plan: 1 diet exercise weight reduction, 2 continue Prilosec daily Premeal 30 to 60 minutes.  Patient was reassured there was no evidence of Andrews's change and this is for symptom control and to help with sense of  regurgitation as well and may offer some benefit for dyspepsia.  3 observation at this point as needed return discussed patient seemed happy with the visit questions were answered.  Uncertain etiology of thoracic discomfort with radiation component but this may represent musculoskeletal or a like etiology that has since resolved patient was reassured.

## 2022-01-10 NOTE — LETTER
1/10/2022         RE: Laura Guajardo  [address] [address] [place] [id number]        Dear Colleague,    Thank you for referring your patient, Laura Guajardo, to the [facility]. Please see a copy of my visit note below.    Dictated.    A 41-year-old female presents for evaluation of GI symptoms.  Patient was at her usual baseline health until June when she developed a sense of regurgitation which has resolved with the use of omeprazole daily.  This PPI is taking correctly in regard to timing before food it does not bother her and she is comfortable remaining on the medication.  There are no alarm features.  Patient underwent EGD on 27 July with a colonoscopy and that both of those exams were normal.  Second, during that time over the summer she had a sense of bloating associated with looser stools which has since resolved with modification of her diet.  She has increased fruits and vegetables and has added fish oil for her health as well as almond milk and symptoms have improved stools are daily formed and nonconcerning.  Third, 4 weeks ago she developed right flank pain associated with a sense of spasm which would radiate to her costal margin.  This too has resolved as part of her evaluation she underwent right upper quadrant ultrasound the raised questions of the liver parenchyma.  Nevertheless, AST ALT were 37 and 21 respectively there is no stones or biliary dilatation by ultrasound.  BMI is 45.20.  Patient was advised to pay attention to triglycerides and she has a future visit to follow-up on this.    Past medical history is remarkable for history of dysuria pelvic cramping urinary urgency obesity history of HPV test.  Positivity.    Medications [facility] on [facility]    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  Habits no no tobacco episodic alcohol over the weekends.  The remainder of the review of systems is negative  noncontributory.    Family history 2 grandparents with colon cancer in the 60s Sister has a polyp unknown type.  Mother  of a AAA.    Review of systems otherwise negative noncontributory.    On exam no acute distress.  [place] [address] pulse 8 7    HEENT unremarkable cardiac S1-S2 without murmur rub or gallop    Lungs clear throughout abdomen limited by habitus soft nontender without organomegaly no percussion tenderness over the costal margin or the thoracic spine.    Extremities no edema no evidence of chronic liver disease.    Laboratory studies amylase 40.  Lipase 6 1.  Platelet count 854067 hemoglobin 13.4.  Electrolytes normal alk phos previously 41 total bili 0.4 ALT 37 AST 21.    Imaging reviewed ultrasound report.    Impression    Abdominal bloating which has resolved with dietary modification this may also have represented a acute self-limited infectious etiology as part of the differential diagnosis.  Stools are now daily and formed and colonoscopy is reassuringly normal as of last July.  Consider elements of nonulcer dyspepsia as well however patient is improved on omeprazole and dietary modification.    Obesity abnormal ultrasound with normal liver function testing.  Certainly, a fatty liver approach may be beneficial with diet exercise and goal of weight loss 5 to 10% discussed.  2 cups of coffee daily is a suggestion from [facility] and may be reasonable if this does not aggravate her reflux.  Patient will follow up with her provider regarding elevated triglycerides but the approach to this would be also similar with diet and weight loss.    Brief discussion of cryptogenic cirrhosis and fatty liver and approaches to this.  There is no medications currently indicated for fatty liver however given just the ultrasound finding with normal liver function tests this is reassuring and at this time there is no indirect evidence of inflammation but given the BMI there is probably probable fat in the  liver as well that may have been appreciated by the ultrasound.    Plan: 1 diet exercise weight reduction, 2 continue Prilosec daily Premeal 30 to 60 minutes.  Patient was reassured there was no evidence of Andrews's change and this is for symptom control and to help with sense of regurgitation as well and may offer some benefit for dyspepsia.  3 observation at this point as needed return discussed patient seemed happy with the visit questions were answered.  Uncertain etiology of thoracic discomfort with radiation component but this may represent musculoskeletal or a like etiology that has since resolved patient was reassured.      Again, thank you for allowing me to participate in the care of your patient.        Sincerely,        Jaime Fang MD

## 2022-01-10 NOTE — PATIENT INSTRUCTIONS
Discussed evaluations and results to date.  Sugggest:  --stay on Prilosec daily 30-60 min pre-meals  --diet, exercise, wt loss 10% goal.  US changes with normal liver function noted  --2 cups coffee a day is fine and may help liver, [facility] suggestion.    As needed return.

## 2022-09-09 DIAGNOSIS — F41.1 GAD (GENERALIZED ANXIETY DISORDER): ICD-10-CM

## 2022-09-09 DIAGNOSIS — R03.0 ELEVATED BLOOD PRESSURE READING WITHOUT DIAGNOSIS OF HYPERTENSION: ICD-10-CM

## 2022-09-09 DIAGNOSIS — K21.00 GASTROESOPHAGEAL REFLUX DISEASE WITH ESOPHAGITIS, UNSPECIFIED WHETHER HEMORRHAGE: ICD-10-CM

## 2022-09-09 DIAGNOSIS — Z80.0 FAMILY HISTORY OF COLON CANCER: ICD-10-CM

## 2022-09-09 DIAGNOSIS — F41.0 PANIC ATTACK: ICD-10-CM

## 2022-09-12 RX ORDER — CITALOPRAM HYDROBROMIDE 10 MG/1
TABLET ORAL
Qty: 90 TABLET | Refills: 0 | Status: SHIPPED | OUTPATIENT
Start: 2022-09-12 | End: 2022-09-13

## 2022-09-12 RX ORDER — PROPRANOLOL HYDROCHLORIDE 20 MG/1
TABLET ORAL
Qty: 90 TABLET | Refills: 0 | Status: SHIPPED | OUTPATIENT
Start: 2022-09-12 | End: 2022-09-13

## 2022-09-12 NOTE — TELEPHONE ENCOUNTER
Celexa  Inderal  Prilosec  Prescription approved per [facility] Refill Protocol.    Radha Reeves RN

## 2022-09-13 ENCOUNTER — OFFICE VISIT (OUTPATIENT)
Dept: FAMILY MEDICINE | Facility: OTHER | Age: 42
End: 2022-09-13
Payer: COMMERCIAL

## 2022-09-13 ENCOUNTER — MYC MEDICAL ADVICE (OUTPATIENT)
Dept: FAMILY MEDICINE | Facility: OTHER | Age: 42
End: 2022-09-13

## 2022-09-13 VITALS
HEIGHT: 64 IN | BODY MASS INDEX: 39.09 KG/M2 | RESPIRATION RATE: 20 BRPM | TEMPERATURE: 96.8 F | SYSTOLIC BLOOD PRESSURE: 110 MMHG | DIASTOLIC BLOOD PRESSURE: 90 MMHG | OXYGEN SATURATION: 99 % | WEIGHT: 229 LBS | HEART RATE: 57 BPM

## 2022-09-13 DIAGNOSIS — R87.810 CERVICAL HIGH RISK HPV (HUMAN PAPILLOMAVIRUS) TEST POSITIVE: ICD-10-CM

## 2022-09-13 DIAGNOSIS — F41.1 GAD (GENERALIZED ANXIETY DISORDER): ICD-10-CM

## 2022-09-13 DIAGNOSIS — Z12.4 CERVICAL CANCER SCREENING: ICD-10-CM

## 2022-09-13 DIAGNOSIS — E78.9 BORDERLINE HIGH CHOLESTEROL: ICD-10-CM

## 2022-09-13 DIAGNOSIS — Z11.59 NEED FOR HEPATITIS C SCREENING TEST: ICD-10-CM

## 2022-09-13 DIAGNOSIS — Z12.31 ENCOUNTER FOR SCREENING MAMMOGRAM FOR BREAST CANCER: ICD-10-CM

## 2022-09-13 DIAGNOSIS — F41.0 PANIC ATTACK: ICD-10-CM

## 2022-09-13 DIAGNOSIS — Z00.00 ROUTINE GENERAL MEDICAL EXAMINATION AT A HEALTH CARE FACILITY: Primary | ICD-10-CM

## 2022-09-13 LAB
ALBUMIN SERPL-MCNC: 3.7 G/DL (ref 3.4–5)
ALP SERPL-CCNC: 40 U/L (ref 40–150)
ALT SERPL W P-5'-P-CCNC: 28 U/L (ref 0–50)
ANION GAP SERPL CALCULATED.3IONS-SCNC: 2 MMOL/L (ref 3–14)
AST SERPL W P-5'-P-CCNC: 16 U/L (ref 0–45)
BILIRUB SERPL-MCNC: 0.5 MG/DL (ref 0.2–1.3)
BUN SERPL-MCNC: 13 MG/DL (ref 7–30)
CALCIUM SERPL-MCNC: 8.3 MG/DL (ref 8.5–10.1)
CHLORIDE BLD-SCNC: 107 MMOL/L (ref 94–109)
CHOLEST SERPL-MCNC: 221 MG/DL
CO2 SERPL-SCNC: 29 MMOL/L (ref 20–32)
CREAT SERPL-MCNC: 0.74 MG/DL (ref 0.52–1.04)
FASTING STATUS PATIENT QL REPORTED: YES
GFR SERPL CREATININE-BSD FRML MDRD: >90 ML/MIN/1.73M2
GLUCOSE BLD-MCNC: 92 MG/DL (ref 70–99)
HCV AB SERPL QL IA: NONREACTIVE
HDLC SERPL-MCNC: 51 MG/DL
LDLC SERPL CALC-MCNC: 146 MG/DL
LIPASE SERPL-CCNC: 92 U/L (ref 73–393)
NONHDLC SERPL-MCNC: 170 MG/DL
POTASSIUM BLD-SCNC: 3.8 MMOL/L (ref 3.4–5.3)
PROT SERPL-MCNC: 7.1 G/DL (ref 6.8–8.8)
SODIUM SERPL-SCNC: 138 MMOL/L (ref 133–144)
TRIGL SERPL-MCNC: 121 MG/DL

## 2022-09-13 PROCEDURE — 86803 HEPATITIS C AB TEST: CPT | Performed by: PHYSICIAN ASSISTANT

## 2022-09-13 PROCEDURE — 83690 ASSAY OF LIPASE: CPT | Performed by: PHYSICIAN ASSISTANT

## 2022-09-13 PROCEDURE — 36415 COLL VENOUS BLD VENIPUNCTURE: CPT | Performed by: PHYSICIAN ASSISTANT

## 2022-09-13 PROCEDURE — 80061 LIPID PANEL: CPT | Performed by: PHYSICIAN ASSISTANT

## 2022-09-13 PROCEDURE — 87624 HPV HI-RISK TYP POOLED RSLT: CPT | Performed by: PHYSICIAN ASSISTANT

## 2022-09-13 PROCEDURE — 99396 PREV VISIT EST AGE 40-64: CPT | Performed by: PHYSICIAN ASSISTANT

## 2022-09-13 PROCEDURE — 99214 OFFICE O/P EST MOD 30 MIN: CPT | Mod: 25 | Performed by: PHYSICIAN ASSISTANT

## 2022-09-13 PROCEDURE — G0145 SCR C/V CYTO,THINLAYER,RESCR: HCPCS | Performed by: PHYSICIAN ASSISTANT

## 2022-09-13 PROCEDURE — 80053 COMPREHEN METABOLIC PANEL: CPT | Performed by: PHYSICIAN ASSISTANT

## 2022-09-13 RX ORDER — PROPRANOLOL HYDROCHLORIDE 20 MG/1
20 TABLET ORAL DAILY
Qty: 90 TABLET | Refills: 3 | Status: SHIPPED | OUTPATIENT
Start: 2022-09-13 | End: 2023-09-08

## 2022-09-13 RX ORDER — CITALOPRAM HYDROBROMIDE 10 MG/1
10 TABLET ORAL DAILY
Qty: 90 TABLET | Refills: 3 | Status: SHIPPED | OUTPATIENT
Start: 2022-09-13 | End: 2023-09-08

## 2022-09-13 ASSESSMENT — ENCOUNTER SYMPTOMS
WEAKNESS: 0
FREQUENCY: 0
HEADACHES: 0
NERVOUS/ANXIOUS: 0
ARTHRALGIAS: 0
DIZZINESS: 0
ABDOMINAL PAIN: 0
NAUSEA: 0
CONSTIPATION: 0
HEMATURIA: 0
MYALGIAS: 0
SORE THROAT: 0
DYSURIA: 0
JOINT SWELLING: 0
PARESTHESIAS: 0
HEARTBURN: 0
SHORTNESS OF BREATH: 0
BREAST MASS: 0
PALPITATIONS: 0
FEVER: 0
COUGH: 0
EYE PAIN: 0
DIARRHEA: 0
CHILLS: 0
HEMATOCHEZIA: 0

## 2022-09-13 NOTE — TELEPHONE ENCOUNTER
Patient is asking if she needs to be menstruating when having her IUD placed.    Ayala Saavedra RN  [facility] ~ Registered Nurse  Clinic Triage ~ [place] [facility] [place]  September 13, 2022

## 2022-09-13 NOTE — RESULT ENCOUNTER NOTE
Farheen Sanchez    Your results were normal. Cholesterol is up in the borderline range, so make sure you are eating a low fat diet.     The results are attached for your review.       Vaibhav Thomas PA-C

## 2022-09-13 NOTE — PROGRESS NOTES
SUBJECTIVE:   CC: Laura Guajardo is an 41 year old woman who presents for preventive health visit.     Patient has been advised of split billing requirements and indicates understanding: Yes  Healthy Habits:     Getting at least 3 servings of Calcium per day:  Yes    Bi-annual eye exam:  NO    Dental care twice a year:  Yes    Sleep apnea or symptoms of sleep apnea:  None    Diet:  Other    Frequency of exercise:  None    Taking medications regularly:  Yes    Medication side effects:  None    PHQ-2 Total Score: 0    Additional concerns today:  Yes    - Whole body pain w/ menses.  - No dizzy, syncope, anemic sx. No abdominal cramping.   - Starts 2 days before menses.   - Has not used birth control in long time.   - Recently changed diet and lost 50 lbs       - Mood      Doing really well on current regimen       Today's PHQ-2 Score:   PHQ-2 ( 1999 Pf[employer]) 9/13/2022   Q1: Little interest or pleasure in doing things 0   Q2: Feeling down, depressed or hopeless 0   PHQ-2 Score 0   PHQ-2 Total Score (12-17 Years)- Positive if 3 or more points; Administer PHQ-A if positive -   Q1: Little interest or pleasure in doing things Not at all   Q2: Feeling down, depressed or hopeless Not at all   PHQ-2 Score 0     Abuse: Current or Past (Physical, Sexual or Emotional) - No  Do you feel safe in your environment? Yes    Social History     Tobacco Use     Smoking status: Former Smoker     Packs/day: 0.50     Years: 5.00     Pack years: 2.50     Types: Cigarettes     Smokeless tobacco: Never Used   Substance Use Topics     Alcohol use: Yes     Comment: 0-2 per week      If you drink alcohol do you typically have >3 drinks per day or >7 drinks per week? No    Alcohol Use 9/13/2022   Prescreen: >3 drinks/day or >7 drinks/week? No   Prescreen: >3 drinks/day or >7 drinks/week? -     Reviewed orders with patient.  Reviewed health maintenance and updated orders accordingly - Yes  Lab work is in process  Labs reviewed in EPIC  BP  Readings from Last 3 Encounters:   09/13/22 (!) 110/90   01/10/22 136/86   01/05/22 124/80    Wt Readings from Last 3 Encounters:   09/13/22 103.9 kg (229 lb)   01/10/22 120.9 kg (266 lb 9.6 oz)   01/05/22 124.5 kg (274 lb 8 oz)          Breast Cancer Screening:    FHS-7:   Breast CA Risk Assessment (FHS-7) 9/16/2021 1/4/2022 9/13/2022   Did any of your first-degree relatives have breast or ovarian cancer? No No Unknown   Did any of your relatives have bilateral breast cancer? No No Unknown   Did any man in your family have breast cancer? No No Unknown   Did any woman in your family have breast and ovarian cancer? No No Unknown   Did any woman in your family have breast cancer before age 50 y? No No Unknown   Do you have 2 or more relatives with breast and/or ovarian cancer? No No Unknown   Do you have 2 or more relatives with breast and/or bowel cancer? Yes No Yes       Mammogram Screening - Patient planning on making apointment. Updated Health Maintenance for mutual plan based on risk factor consideration.     Pertinent mammograms are reviewed under the imaging tab.    History of abnormal Pap smear: YES- age 30- 65 PAP every year for 3 years recommended  PAP / HPV Latest Ref Rng & Units 9/13/2021 8/5/2020 11/8/2013   PAP   Negative for Intraepithelial Lesion or Malignancy (NILM) - -   PAP (Historical) - - NIL NIL   HPV16 Negative Negative Negative -   HPV18 Negative Negative Negative -   HRHPV Negative Negative Positive(A) -     Reviewed and updated as needed this visit by clinical staff   Tobacco  Allergies  Meds   Med Hx  Surg Hx  Fam Hx  Soc Hx          Reviewed and updated as needed this visit by Provider   Tobacco  Allergies  Meds  Problems  Med Hx  Surg Hx  Fam Hx             Past Medical History:   Diagnosis Date     Anxiety      Cervical high risk HPV (human papillomavirus) test positive 08/05/2020    See problem list      Past Surgical History:   Procedure Laterality Date     COLONOSCOPY N/A  "7/27/2021    Procedure: COLONOSCOPY;  Surgeon: Jose Evangelista DO;  Location:  GI     ESOPHAGOSCOPY, GASTROSCOPY, DUODENOSCOPY (EGD), COMBINED N/A 7/27/2021    Procedure: ESOPHAGOGASTRODUODENOSCOPY (EGD);  Surgeon: Jose Evangelista DO;  Location:  GI     TUBAL LIGATION  2006       Review of Systems   Constitutional: Negative for chills and fever.   HENT: Negative for congestion, ear pain, hearing loss and sore throat.    Eyes: Negative for pain and visual disturbance.   Respiratory: Negative for cough and shortness of breath.    Cardiovascular: Negative for chest pain, palpitations and peripheral edema.   Gastrointestinal: Negative for abdominal pain, constipation, diarrhea, heartburn, hematochezia and nausea.   Breasts:  Negative for tenderness, breast mass and discharge.   Genitourinary: Negative for dysuria, frequency, genital sores, hematuria, pelvic pain, urgency, vaginal bleeding and vaginal discharge.   Musculoskeletal: Negative for arthralgias, joint swelling and myalgias.   Skin: Negative for rash.   Neurological: Negative for dizziness, weakness, headaches and paresthesias.   Psychiatric/Behavioral: Negative for mood changes. The patient is not nervous/anxious.           OBJECTIVE:   BP (!) 110/90   Pulse 57   Temp 96.8  F (36  C) (Temporal)   Resp 20   Ht 1.632 m (5' 4.25\")   Wt 103.9 kg (229 lb)   LMP 09/03/2022 (Exact Date)   SpO2 99%   Breastfeeding No   BMI 39.00 kg/m    Physical Exam  Constitutional:       General: She is not in acute distress.     Appearance: She is well-developed.   HENT:      Right Ear: External ear normal.      Left Ear: External ear normal.      Nose: Nose normal.      Mouth/Throat:      Pharynx: No oropharyngeal exudate.   Eyes:      General:         Right eye: No discharge.         Left eye: No discharge.      Conjunctiva/sclera: Conjunctivae normal.      Pupils: Pupils are equal, round, and reactive to light.   Neck:      Thyroid: No thyromegaly. "      Vascular: No JVD.      Trachea: No tracheal deviation.   Cardiovascular:      Rate and Rhythm: Normal rate and regular rhythm.      Heart sounds: Normal heart sounds. No murmur heard.    No friction rub. No gallop.   Pulmonary:      Effort: Pulmonary effort is normal. No respiratory distress.      Breath sounds: Normal breath sounds. No stridor. No wheezing or rales.   Chest:   Breasts: Breasts are symmetrical.      Right: No inverted nipple, mass, nipple discharge or skin change.      Left: No inverted nipple, mass, nipple discharge or skin change.       Abdominal:      General: Bowel sounds are normal. There is no distension.      Palpations: Abdomen is soft. There is no mass.      Tenderness: There is no abdominal tenderness. There is no guarding or rebound.      Hernia: No hernia is present.   Genitourinary:     Vagina: Normal. No vaginal discharge.      Cervix: No cervical motion tenderness or discharge.      Adnexa:         Right: No mass, tenderness or fullness.          Left: No mass, tenderness or fullness.     Musculoskeletal:         General: Normal range of motion.      Cervical back: Normal range of motion and neck supple.   Lymphadenopathy:      Cervical: No cervical adenopathy.   Skin:     General: Skin is warm and dry.   Neurological:      Mental Status: She is alert and oriented to person, place, and time.   Psychiatric:         Behavior: Behavior normal.         Thought Content: Thought content normal.         Judgment: Judgment normal.     - Pap collected       Diagnostic Test Results:  Labs reviewed in Epic    ASSESSMENT/PLAN:       ICD-10-CM    1. Routine general medical examination at a health care facility  Z00.00    2. Need for hepatitis C screening test  Z11.59 Hepatitis C Screen Reflex to HCV RNA Quant and Genotype     Hepatitis C Screen Reflex to HCV RNA Quant and Genotype   3. Cervical cancer screening  Z12.4 Pap Screen with HPV - recommended age 30 - 65 years   4. BLAINE (generalized  "anxiety disorder)  F41.1 citalopram (CELEXA) 10 MG tablet     propranolol (INDERAL) 20 MG tablet     OFFICE/OUTPT VISIT,EST,LEVL IV   5. Panic attack  F41.0 citalopram (CELEXA) 10 MG tablet     propranolol (INDERAL) 20 MG tablet     OFFICE/OUTPT VISIT,EST,LEVL IV   6. Cervical high risk HPV (human papillomavirus) test positive  R87.810 Pap Screen with HPV - recommended age 30 - 65 years   7. Borderline high cholesterol  E78.9 Lipid panel reflex to direct LDL Fasting     Lipid panel reflex to direct LDL Fasting     OFFICE/OUTPT VISIT,EST,LEVL IV   8. Encounter for screening mammogram for breast cancer  Z12.31 MA Screen Bilateral w/Rudy     - Recommend fasting labs today, recently lost 50 lbs      Had borderline BP and cholesterol in the past, would like to make sure these have improved     Is fasting today, await results     - Mood      Stable on current regimen, requests refills      Reviewed medication use and side effects, refilled as needed     - Periods      Changed, having significant PMS symptoms      Had labs in January, FSH showed no signs of early menopause, not currently on birth control as she has had tubal ligation      Discussed likely needs hormonal replacement in the form of birth control to control PMS. Discussed her as candidate for hormonal IUD vs. OCP vs. Implanon vs. other. Can schedule appt with GYN to discuss options and/or further testing     - Schedule mammogram         Patient has been advised of split billing requirements and indicates understanding: Yes     COUNSELING:  Reviewed preventive health counseling, as reflected in patient instructions    Estimated body mass index is 39 kg/m  as calculated from the following:    Height as of this encounter: 1.632 m (5' 4.25\").    Weight as of this encounter: 103.9 kg (229 lb).    Weight management plan: Patient is doing well with weight loss at home. Continue current regimen at home.     She reports that she has quit smoking. Her smoking use " included cigarettes. She has a 2.50 pack-year smoking history. She has never used smokeless tobacco.    Counseling Resources:  ATP IV Guidelines  Pooled Cohorts Equation Calculator  Breast Cancer Risk Calculator  BRCA-Related Cancer Risk Assessment: FHS-7 Tool  FRAX Risk Assessment  ICSI Preventive Guidelines  Dietary Guidelines for Americans, 2010  USDA's MyPlate  ASA Prophylaxis  Lung CA Screening      Review of the result(s) of each unique test - See list        1/5/22  - all labs   Diagnosis or treatment significantly limited by social determinants of health - None     40 minutes spent on the date of the encounter doing chart review, history and exam, documentation and further activities as noted above    The patient indicates understanding of these issues and agrees with the plan.    Follow up: with GYN provider     I, Vaibhav Thomas PA-C,  was present with the PA student who participated in the service and in the documentation of the note.  I have verified the history and personally performed the physical exam and medical decision making.  I agree with the assessment and plan of care as documented in the note.     BEVERLY Burton-S2  [facility] GOLDIE CabanC  [facility]

## 2022-09-14 PROBLEM — E78.9 BORDERLINE HIGH CHOLESTEROL: Status: ACTIVE | Noted: 2022-09-14

## 2022-09-14 NOTE — TELEPHONE ENCOUNTER
No, she does not need to be on her period.  She should be reasonable sure she is not pregnant at the time of insertion and come a little early so we can get a urine sample for a pregnancy test.  If she tolerates ibuprofen, I recommend she take 600 mg by mouth one hour prior to her scheduled apt time.

## 2022-09-15 LAB
BKR LAB AP GYN ADEQUACY: NORMAL
BKR LAB AP GYN INTERPRETATION: NORMAL
BKR LAB AP HPV REFLEX: NORMAL
BKR LAB AP PREVIOUS ABNL DX: NORMAL
BKR LAB AP PREVIOUS ABNORMAL: NORMAL
PATH REPORT.COMMENTS IMP SPEC: NORMAL
PATH REPORT.COMMENTS IMP SPEC: NORMAL
PATH REPORT.RELEVANT HX SPEC: NORMAL

## 2022-09-21 ENCOUNTER — ANCILLARY PROCEDURE (OUTPATIENT)
Dept: MAMMOGRAPHY | Facility: OTHER | Age: 42
End: 2022-09-21
Attending: PHYSICIAN ASSISTANT
Payer: COMMERCIAL

## 2022-09-21 DIAGNOSIS — Z12.31 ENCOUNTER FOR SCREENING MAMMOGRAM FOR BREAST CANCER: ICD-10-CM

## 2022-09-21 PROCEDURE — 77063 BREAST TOMOSYNTHESIS BI: CPT | Mod: TC | Performed by: RADIOLOGY

## 2022-09-21 PROCEDURE — 77067 SCR MAMMO BI INCL CAD: CPT | Mod: TC | Performed by: RADIOLOGY

## 2022-11-09 ENCOUNTER — OFFICE VISIT (OUTPATIENT)
Dept: FAMILY MEDICINE | Facility: CLINIC | Age: 42
End: 2022-11-09
Payer: COMMERCIAL

## 2022-11-09 VITALS
HEART RATE: 55 BPM | BODY MASS INDEX: 39.22 KG/M2 | SYSTOLIC BLOOD PRESSURE: 137 MMHG | DIASTOLIC BLOOD PRESSURE: 85 MMHG | WEIGHT: 230.3 LBS | TEMPERATURE: 98.4 F | OXYGEN SATURATION: 99 % | RESPIRATION RATE: 10 BRPM

## 2022-11-09 DIAGNOSIS — R10.2 PELVIC CRAMPING: ICD-10-CM

## 2022-11-09 DIAGNOSIS — Z30.430 ENCOUNTER FOR IUD INSERTION: Primary | ICD-10-CM

## 2022-11-09 LAB — HCG UR QL: NEGATIVE

## 2022-11-09 PROCEDURE — 58300 INSERT INTRAUTERINE DEVICE: CPT | Performed by: FAMILY MEDICINE

## 2022-11-09 PROCEDURE — 99207 PR DROP WITH A PROCEDURE: CPT | Performed by: FAMILY MEDICINE

## 2022-11-09 PROCEDURE — 81025 URINE PREGNANCY TEST: CPT | Performed by: FAMILY MEDICINE

## 2022-11-09 ASSESSMENT — PAIN SCALES - GENERAL: PAINLEVEL: MILD PAIN (3)

## 2022-11-09 NOTE — PROGRESS NOTES
Assessment & Plan     Encounter for IUD insertion  Uncomplicated Mirena insertion for dysmenorrhea symptoms.  See procedure note below.  - HCG qualitative urine; Future  - HCG qualitative urine  - levonorgestrel (MIRENA) 20 MCG/DAY IUD 20 mcg  - MA INSERTION OF IUD FOR THERAPEUTIC PURPOSES    Pelvic cramping  Increased menstrual symptoms.      Return in about 6 weeks (around 12/21/2022) for IUD string check with PCP.    Kai Mckoy MD  [facility] SAURABH Sanchez is a 42 year old, presenting for the following health issues:  No chief complaint on file.      HPI     Referred by PCP for IUD placement secondary to dysmenorrhea    Review of Systems   Constitutional, HEENT, cardiovascular, pulmonary, gi and gu systems are negative, except as otherwise noted.      Objective    There were no vitals taken for this visit.  There is no height or weight on file to calculate BMI.  Physical Exam   GENERAL: healthy, alert and no distress   (female): normal female external genitalia, normal urethral meatus, vaginal mucosa, normal cervix/adnexa/uterus without masses or discharge  MS: no gross musculoskeletal defects noted, no edema  NEURO: Normal strength and tone, mentation intact and speech normal  PSYCH: mentation appears normal, affect normal/bright      Procedure Note: IUD Insertion  Procedure was explained to patient in depth to include risks and alternatives.  Written consent obtained.  Patient was placed in lithotomy position, speculum was placed, cervical os identified.  Lidocaine jelly applied to superior aspect of cervix.  Using sterile technique, outside of vagina and cervix prepped with Betadine.  Tenaculum was placed at the 10:00 and 2 o'clock position.  Uterus was sounded to 6.5 cm.  Mirena was inserted using usual technique.  Strings were cut at 2 to 3 cm, sample given to patient.  Minimal EBL, patient tolerated procedure well and was discharged in stable condition with follow-up  instructions.  Will return in 6 weeks for string check, sooner if any other concern.    NDC: 91284-738-00  Lot: [id number]  Exp: November 2023    Kai Mckoy MD, FAAFP  Family Medicine Physician  [facility] [place]  [address] [place] [address]

## 2022-12-09 DIAGNOSIS — F41.0 PANIC ATTACK: ICD-10-CM

## 2022-12-09 DIAGNOSIS — F41.1 GAD (GENERALIZED ANXIETY DISORDER): ICD-10-CM

## 2022-12-09 DIAGNOSIS — Z80.0 FAMILY HISTORY OF COLON CANCER: ICD-10-CM

## 2022-12-09 DIAGNOSIS — K21.00 GASTROESOPHAGEAL REFLUX DISEASE WITH ESOPHAGITIS, UNSPECIFIED WHETHER HEMORRHAGE: ICD-10-CM

## 2022-12-13 RX ORDER — CITALOPRAM HYDROBROMIDE 10 MG/1
TABLET ORAL
Qty: 90 TABLET | Refills: 0 | OUTPATIENT
Start: 2022-12-13

## 2022-12-13 RX ORDER — PROPRANOLOL HYDROCHLORIDE 20 MG/1
TABLET ORAL
Qty: 90 TABLET | Refills: 0 | OUTPATIENT
Start: 2022-12-13

## 2022-12-13 NOTE — TELEPHONE ENCOUNTER
"Prilosec  Prescription approved per [facility] Refill Protocol.    Celexa  Inderal  Denied, sent 9/13/2022 for 3 months and 3 refills to this pharmacy, both requests stated above.  Requested Prescriptions   Pending Prescriptions Disp Refills     propranolol (INDERAL) 20 MG tablet [Pharmacy Med Name: PROPRANOLOL HCL 20MG TABS] 90 tablet 0     Sig: TAKE ONE TABLET BY MOUTH ONCE DAILY       Beta-Blockers Protocol Passed - 12/9/2022  5:02 AM        Passed - Blood pressure under 140/90 in past 12 months     BP Readings from Last 3 Encounters:   11/09/22 137/85   09/13/22 (!) 110/90   01/10/22 136/86                 Passed - Patient is age 6 or older        Passed - Recent (12 mo) or future (30 days) visit within the authorizing provider's specialty     Patient has had an office visit with the authorizing provider or a provider within the authorizing providers department within the previous 12 mos or has a future within next 30 days. See \"Patient Info\" tab in inbasket, or \"Choose Columns\" in Meds & Orders section of the refill encounter.              Passed - Medication is active on med list           citalopram (CELEXA) 10 MG tablet [Pharmacy Med Name: CITALOPRAM 10MG TAB] 90 tablet 0     Sig: TAKE ONE TABLET BY MOUTH ONCE DAILY       SSRIs Protocol Passed - 12/9/2022  5:02 AM        Passed - Recent (12 mo) or future (30 days) visit within the authorizing provider's specialty     Patient has had an office visit with the authorizing provider or a provider within the authorizing providers department within the previous 12 mos or has a future within next 30 days. See \"Patient Info\" tab in inbasket, or \"Choose Columns\" in Meds & Orders section of the refill encounter.              Passed - Medication is active on med list        Passed - Patient is age 18 or older        Passed - No active pregnancy on record        Passed - No positive pregnancy test in last 12 months           omeprazole (PRILOSEC) 20 MG DR capsule [Pharmacy " "Med Name: OMEPRAZOLE 20MG CPDR] 90 capsule 0     Sig: TAKE ONE CAPSULE BY MOUTH ONCE DAILY       PPI Protocol Passed - 12/9/2022  5:02 AM        Passed - Not on Clopidogrel (unless Pantoprazole ordered)        Passed - No diagnosis of osteoporosis on record        Passed - Recent (12 mo) or future (30 days) visit within the authorizing provider's specialty     Patient has had an office visit with the authorizing provider or a provider within the authorizing providers department within the previous 12 mos or has a future within next 30 days. See \"Patient Info\" tab in inbasket, or \"Choose Columns\" in Meds & Orders section of the refill encounter.              Passed - Medication is active on med list        Passed - Patient is age 18 or older        Passed - No active pregnacy on record        Passed - No positive pregnancy test in past 12 months             "

## 2022-12-19 ENCOUNTER — OFFICE VISIT (OUTPATIENT)
Dept: FAMILY MEDICINE | Facility: OTHER | Age: 42
End: 2022-12-19
Payer: COMMERCIAL

## 2022-12-19 VITALS
HEART RATE: 65 BPM | WEIGHT: 235 LBS | HEIGHT: 64 IN | OXYGEN SATURATION: 98 % | TEMPERATURE: 99.9 F | SYSTOLIC BLOOD PRESSURE: 110 MMHG | DIASTOLIC BLOOD PRESSURE: 72 MMHG | BODY MASS INDEX: 40.12 KG/M2

## 2022-12-19 DIAGNOSIS — K21.00 GASTROESOPHAGEAL REFLUX DISEASE WITH ESOPHAGITIS, UNSPECIFIED WHETHER HEMORRHAGE: ICD-10-CM

## 2022-12-19 DIAGNOSIS — Z80.0 FAMILY HISTORY OF COLON CANCER: ICD-10-CM

## 2022-12-19 DIAGNOSIS — Z30.431 IUD CHECK UP: Primary | ICD-10-CM

## 2022-12-19 PROCEDURE — 99213 OFFICE O/P EST LOW 20 MIN: CPT | Performed by: FAMILY MEDICINE

## 2022-12-19 ASSESSMENT — PAIN SCALES - GENERAL: PAINLEVEL: NO PAIN (0)

## 2022-12-19 NOTE — PROGRESS NOTES
Assessment & Plan       ICD-10-CM    1. IUD check up  Z30.431       2. Family history of colon cancer  Z80.0       3. Gastroesophageal reflux disease with esophagitis, unspecified whether hemorrhage  K21.00         IUD in place and reassured.  We reviewed the efficacy for this is through 5 years for period control which is main reason she is using this.  Though it can have birth birth control assistance through year 7.  Also patient had her reflux medications completed lately and we did review with her the lifestyle changes that can help maintain this and she will work to continue to improve these lifestyles.  Mood is doing well at this time and she has no interest in any change in the medications    5 minutes spent on the date of the encounter doing chart review, history and exam, documentation and further activities per the note      No follow-ups on file.    Lyudmila Mckee MD, MD  [facility] JOSHUA Sanchez is a 42 year old, presenting for the following health issues:  Follow Up (IUD check)      History of Present Illness       Reason for visit:  IUD follow up    She eats 4 or more servings of fruits and vegetables daily.She consumes 0 sweetened beverage(s) daily.She exercises with enough effort to increase her heart rate 9 or less minutes per day.  She exercises with enough effort to increase her heart rate 3 or less days per week.   She is taking medications regularly.       Anxiety Follow-Up    How are you doing with your anxiety since your last visit? No change    Are you having other symptoms that might be associated with anxiety? No    Have you had a significant life event? No     Are you feeling depressed? No    Do you have any concerns with your use of alcohol or other drugs? No    Social History     Tobacco Use     Smoking status: Former     Packs/day: 0.50     Years: 5.00     Pack years: 2.50     Types: Cigarettes     Smokeless tobacco: Never   Vaping Use     Vaping Use:  "Never used   Substance Use Topics     Alcohol use: Yes     Comment: 0-2 per week      Drug use: No     BLAINE-7 SCORE 8/24/2020 8/25/2020 5/27/2021   Total Score - 16 (severe anxiety) 3 (minimal anxiety)   Total Score 18 16 3     PHQ 8/24/2020 8/25/2020 5/27/2021   PHQ-9 Total Score 21 15 4   Q9: Thoughts of better off dead/self-harm past 2 weeks Not at all Not at all Not at all     Last PHQ-9 5/27/2021   1.  Little interest or pleasure in doing things 0   2.  Feeling down, depressed, or hopeless 0   3.  Trouble falling or staying asleep, or sleeping too much 1   4.  Feeling tired or having little energy 1   5.  Poor appetite or overeating 1   6.  Feeling bad about yourself 0   7.  Trouble concentrating 1   8.  Moving slowly or restless 0   Q9: Thoughts of better off dead/self-harm past 2 weeks 0   PHQ-9 Total Score 4   Difficulty at work, home, or with people -     BLAINE-7  5/27/2021   1. Feeling nervous, anxious, or on edge 0   2. Not being able to stop or control worrying 0   3. Worrying too much about different things 1   4. Trouble relaxing 1   5. Being so restless that it is hard to sit still 0   6. Becoming easily annoyed or irritable 0   7. Feeling afraid, as if something awful might happen 1   BLAINE-7 Total Score 3   If you checked any problems, how difficult have they made it for you to do your work, take care of things at home, or get along with other people? -           Review of Systems   Constitutional, HEENT, cardiovascular, pulmonary, GI, , musculoskeletal, neuro, skin, endocrine and psych systems are negative, except as otherwise noted.      Objective    /72   Pulse 65   Temp 99.9  F (37.7  C) (Temporal)   Ht 1.63 m (5' 4.17\")   Wt 106.6 kg (235 lb)   LMP 12/19/2022 (Exact Date)   SpO2 98%   BMI 40.12 kg/m    Body mass index is 40.12 kg/m .  Physical Exam   GENERAL: healthy, alert and no distress  RESP: lungs clear to auscultation - no rales, rhonchi or wheezes  CV: regular rate and rhythm, " normal S1 S2, no S3 or S4, no murmur, click or rub, no peripheral edema and peripheral pulses strong   (female): normal female external genitalia, normal urethral meatus , vaginal mucosa pink, moist, well rugated and normal cervix, adnexae, and uterus without masses. Strings in place  PSYCH: mentation appears normal, affect normal/bright

## 2023-04-22 ENCOUNTER — HEALTH MAINTENANCE LETTER (OUTPATIENT)
Age: 43
End: 2023-04-22

## 2023-06-11 DIAGNOSIS — K21.00 GASTROESOPHAGEAL REFLUX DISEASE WITH ESOPHAGITIS, UNSPECIFIED WHETHER HEMORRHAGE: ICD-10-CM

## 2023-06-11 DIAGNOSIS — Z80.0 FAMILY HISTORY OF COLON CANCER: ICD-10-CM

## 2023-08-14 ENCOUNTER — PATIENT OUTREACH (OUTPATIENT)
Dept: CARE COORDINATION | Facility: CLINIC | Age: 43
End: 2023-08-14
Payer: COMMERCIAL
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
## 2023-08-28 ENCOUNTER — PATIENT OUTREACH (OUTPATIENT)
Dept: CARE COORDINATION | Facility: CLINIC | Age: 43
End: 2023-08-28
Payer: COMMERCIAL

## 2023-09-08 ENCOUNTER — MYC MEDICAL ADVICE (OUTPATIENT)
Dept: FAMILY MEDICINE | Facility: OTHER | Age: 43
End: 2023-09-08
Payer: COMMERCIAL

## 2023-09-08 DIAGNOSIS — F41.0 PANIC ATTACK: ICD-10-CM

## 2023-09-08 DIAGNOSIS — F41.1 GAD (GENERALIZED ANXIETY DISORDER): ICD-10-CM

## 2023-09-08 RX ORDER — PROPRANOLOL HYDROCHLORIDE 20 MG/1
20 TABLET ORAL DAILY
Qty: 90 TABLET | Refills: 0 | Status: SHIPPED | OUTPATIENT
Start: 2023-09-08 | End: 2023-10-10

## 2023-09-08 RX ORDER — CITALOPRAM HYDROBROMIDE 10 MG/1
10 TABLET ORAL DAILY
Qty: 90 TABLET | Refills: 0 | Status: SHIPPED | OUTPATIENT
Start: 2023-09-08 | End: 2023-10-10

## 2023-09-08 NOTE — TELEPHONE ENCOUNTER
Omeprazole last sent 6/12/2023 for #90 with 1 refill.   Placed called to pharmacy, patient has one fill of Omeprazole.     Routing Celexa and Propanolol for refill.     Pending Prescriptions:                       Disp   Refills    propranolol (INDERAL) 20 MG tablet        90 tab*3            Sig: Take 1 tablet (20 mg) by mouth daily    citalopram (CELEXA) 10 MG tablet          90 tab*3            Sig: Take 1 tablet (10 mg) by mouth daily    RADHA BondN, RN  [facility] ~ Registered Nurse  Clinic Triage ~ [place] [facility] [place]  September 8, 2023

## 2023-10-09 ASSESSMENT — ENCOUNTER SYMPTOMS
COUGH: 0
BREAST MASS: 0
WEAKNESS: 0
PARESTHESIAS: 0
JOINT SWELLING: 0
NAUSEA: 0
DIARRHEA: 0
HEMATURIA: 0
NERVOUS/ANXIOUS: 0
SORE THROAT: 0
ABDOMINAL PAIN: 0
CONSTIPATION: 0
HEADACHES: 0
SHORTNESS OF BREATH: 0
ARTHRALGIAS: 0
FEVER: 0
HEMATOCHEZIA: 0
FREQUENCY: 0
DIZZINESS: 0
CHILLS: 0
PALPITATIONS: 0
MYALGIAS: 0
DYSURIA: 0
EYE PAIN: 0
HEARTBURN: 0

## 2023-10-09 ASSESSMENT — ANXIETY QUESTIONNAIRES
1. FEELING NERVOUS, ANXIOUS, OR ON EDGE: NOT AT ALL
6. BECOMING EASILY ANNOYED OR IRRITABLE: NOT AT ALL
GAD7 TOTAL SCORE: 0
2. NOT BEING ABLE TO STOP OR CONTROL WORRYING: NOT AT ALL
GAD7 TOTAL SCORE: 0
4. TROUBLE RELAXING: NOT AT ALL
5. BEING SO RESTLESS THAT IT IS HARD TO SIT STILL: NOT AT ALL
3. WORRYING TOO MUCH ABOUT DIFFERENT THINGS: NOT AT ALL
7. FEELING AFRAID AS IF SOMETHING AWFUL MIGHT HAPPEN: NOT AT ALL

## 2023-10-10 ENCOUNTER — OFFICE VISIT (OUTPATIENT)
Dept: FAMILY MEDICINE | Facility: OTHER | Age: 43
End: 2023-10-10
Payer: COMMERCIAL

## 2023-10-10 VITALS
HEIGHT: 65 IN | RESPIRATION RATE: 16 BRPM | HEART RATE: 55 BPM | WEIGHT: 271.5 LBS | OXYGEN SATURATION: 99 % | DIASTOLIC BLOOD PRESSURE: 86 MMHG | TEMPERATURE: 98.3 F | BODY MASS INDEX: 45.23 KG/M2 | SYSTOLIC BLOOD PRESSURE: 120 MMHG

## 2023-10-10 DIAGNOSIS — Z82.49 FAMILY HISTORY OF ABDOMINAL AORTIC ANEURYSM (AAA): ICD-10-CM

## 2023-10-10 DIAGNOSIS — Z80.0 FAMILY HISTORY OF COLON CANCER: ICD-10-CM

## 2023-10-10 DIAGNOSIS — Z13.1 SCREENING FOR DIABETES MELLITUS: ICD-10-CM

## 2023-10-10 DIAGNOSIS — K21.00 GASTROESOPHAGEAL REFLUX DISEASE WITH ESOPHAGITIS, UNSPECIFIED WHETHER HEMORRHAGE: ICD-10-CM

## 2023-10-10 DIAGNOSIS — Z23 NEED FOR HEPATITIS B BOOSTER VACCINATION: ICD-10-CM

## 2023-10-10 DIAGNOSIS — F41.1 GAD (GENERALIZED ANXIETY DISORDER): ICD-10-CM

## 2023-10-10 DIAGNOSIS — F41.0 PANIC ATTACK: ICD-10-CM

## 2023-10-10 DIAGNOSIS — Z00.00 ROUTINE GENERAL MEDICAL EXAMINATION AT A HEALTH CARE FACILITY: Primary | ICD-10-CM

## 2023-10-10 DIAGNOSIS — Z13.220 SCREENING FOR LIPID DISORDERS: ICD-10-CM

## 2023-10-10 LAB
CHOLEST SERPL-MCNC: 192 MG/DL
FASTING STATUS PATIENT QL REPORTED: YES
GLUCOSE SERPL-MCNC: 101 MG/DL (ref 70–99)
HDLC SERPL-MCNC: 43 MG/DL
LDLC SERPL CALC-MCNC: 115 MG/DL
NONHDLC SERPL-MCNC: 149 MG/DL
TRIGL SERPL-MCNC: 169 MG/DL

## 2023-10-10 PROCEDURE — 99396 PREV VISIT EST AGE 40-64: CPT | Mod: 25 | Performed by: PHYSICIAN ASSISTANT

## 2023-10-10 PROCEDURE — 99213 OFFICE O/P EST LOW 20 MIN: CPT | Mod: 25 | Performed by: PHYSICIAN ASSISTANT

## 2023-10-10 PROCEDURE — 36415 COLL VENOUS BLD VENIPUNCTURE: CPT | Performed by: PHYSICIAN ASSISTANT

## 2023-10-10 PROCEDURE — 80061 LIPID PANEL: CPT | Performed by: PHYSICIAN ASSISTANT

## 2023-10-10 PROCEDURE — 90746 HEPB VACCINE 3 DOSE ADULT IM: CPT | Performed by: PHYSICIAN ASSISTANT

## 2023-10-10 PROCEDURE — 82947 ASSAY GLUCOSE BLOOD QUANT: CPT | Performed by: PHYSICIAN ASSISTANT

## 2023-10-10 PROCEDURE — 90471 IMMUNIZATION ADMIN: CPT | Performed by: PHYSICIAN ASSISTANT

## 2023-10-10 RX ORDER — CITALOPRAM HYDROBROMIDE 10 MG/1
10 TABLET ORAL DAILY
Qty: 90 TABLET | Refills: 3 | Status: SHIPPED | OUTPATIENT
Start: 2023-10-10

## 2023-10-10 RX ORDER — FAMOTIDINE 40 MG/1
40 TABLET, FILM COATED ORAL DAILY
Qty: 90 TABLET | Refills: 3 | Status: SHIPPED | OUTPATIENT
Start: 2023-10-10

## 2023-10-10 RX ORDER — PROPRANOLOL HYDROCHLORIDE 20 MG/1
20 TABLET ORAL DAILY
Qty: 90 TABLET | Refills: 3 | Status: SHIPPED | OUTPATIENT
Start: 2023-10-10

## 2023-10-10 ASSESSMENT — ENCOUNTER SYMPTOMS
CONSTIPATION: 0
ARTHRALGIAS: 0
JOINT SWELLING: 0
NAUSEA: 0
ABDOMINAL PAIN: 0
WEAKNESS: 0
PARESTHESIAS: 0
DYSURIA: 0
PALPITATIONS: 0
COUGH: 0
FREQUENCY: 0
DIARRHEA: 0
FEVER: 0
CHILLS: 0
SHORTNESS OF BREATH: 0
HEADACHES: 0
SORE THROAT: 0
MYALGIAS: 0
BREAST MASS: 0
EYE PAIN: 0
DIZZINESS: 0
HEMATOCHEZIA: 0
HEMATURIA: 0
HEARTBURN: 0
NERVOUS/ANXIOUS: 0

## 2023-10-10 ASSESSMENT — PAIN SCALES - GENERAL: PAINLEVEL: NO PAIN (0)

## 2023-10-10 NOTE — PROGRESS NOTES
a   SUBJECTIVE:   CC: Laura is an 43 year old who presents for preventive health visit.       10/10/2023     7:41 AM   Additional Questions   Roomed by Taty       Healthy Habits:     Getting at least 3 servings of Calcium per day:  Yes    Bi-annual eye exam:  NO    Dental care twice a year:  Yes    Sleep apnea or symptoms of sleep apnea:  None    Diet:  Regular (no restrictions)    Frequency of exercise:  1 day/week    Duration of exercise:  15-30 minutes    Taking medications regularly:  Yes    Medication side effects:  None    Additional concerns today:  No    - Patient reports doing well on regimen   - Wondering about AAA screening, had done once but now both of her parents have had one         Today's PHQ-2 Score:       10/9/2023     9:43 AM   PHQ-2 (  [employer])   Q1: Little interest or pleasure in doing things 0   Q2: Feeling down, depressed or hopeless 0   PHQ-2 Score 0   Q1: Little interest or pleasure in doing things Not at all   Q2: Feeling down, depressed or hopeless Not at all   PHQ-2 Score 0       Social History     Tobacco Use    Smoking status: Former     Packs/day: 0.50     Years: 5.00     Pack years: 2.50     Types: Cigarettes    Smokeless tobacco: Never   Substance Use Topics    Alcohol use: Yes     Comment: 0-2 per week              10/9/2023     9:43 AM   Alcohol Use   Prescreen: >3 drinks/day or >7 drinks/week? No     Reviewed orders with patient.  Reviewed health maintenance and updated orders accordingly - Yes  Lab work is in process  Labs reviewed in EPIC  BP Readings from Last 3 Encounters:   10/10/23 120/86   22 110/72   22 137/85    Wt Readings from Last 3 Encounters:   10/10/23 123.2 kg (271 lb 8 oz)   22 106.6 kg (235 lb)   22 104.5 kg (230 lb 4.8 oz)                    Breast Cancer Screenin/4/2022     8:52 PM 2022     6:39 AM 2022     8:08 AM   Breast CA Risk Assessment (FHS-7)   Do you have a family history of breast, colon, or ovarian  cancer? Yes Yes No / Unknown         Mammogram Screening - Offered annual screening and updated Health Maintenance for [place] plan based on risk factor consideration  Pertinent mammograms are reviewed under the imaging tab.    History of abnormal Pap smear: NO - age 30-65 PAP every 5 years with negative HPV co-testing recommended      Latest Ref Rng & Units 9/13/2022     8:44 AM 9/13/2021     3:45 PM 8/5/2020     4:30 PM   PAP / HPV   PAP  Negative for Intraepithelial Lesion or Malignancy (NILM)  Negative for Intraepithelial Lesion or Malignancy (NILM)     HPV 16 DNA Negative Negative  Negative  Negative    HPV 18 DNA Negative Negative  Negative  Negative    Other HR HPV Negative Negative  Negative  Positive      Reviewed and updated as needed this visit by clinical staff   Tobacco  Allergies  Meds  Problems  Med Hx  Surg Hx  Fam Hx          Reviewed and updated as needed this visit by Provider   Tobacco  Allergies  Meds  Problems  Med Hx  Surg Hx  Fam Hx         Past Medical History:   Diagnosis Date    Anxiety     Cervical high risk HPV (human papillomavirus) test positive 08/05/2020    See problem list      Past Surgical History:   Procedure Laterality Date    COLONOSCOPY N/A 7/27/2021    Procedure: COLONOSCOPY;  Surgeon: Jose Evangelista DO;  Location:  GI    ESOPHAGOSCOPY, GASTROSCOPY, DUODENOSCOPY (EGD), COMBINED N/A 7/27/2021    Procedure: ESOPHAGOGASTRODUODENOSCOPY (EGD);  Surgeon: Jose Evangelista DO;  Location:  GI    TUBAL LIGATION  2006       Review of Systems   Constitutional:  Negative for chills and fever.   HENT:  Negative for congestion, ear pain, hearing loss and sore throat.    Eyes:  Negative for pain and visual disturbance.   Respiratory:  Negative for cough and shortness of breath.    Cardiovascular:  Negative for chest pain, palpitations and peripheral edema.   Gastrointestinal:  Negative for abdominal pain, constipation, diarrhea, heartburn, hematochezia and  "nausea.   Breasts:  Negative for tenderness, breast mass and discharge.   Genitourinary:  Negative for dysuria, frequency, genital sores, hematuria, pelvic pain, urgency, vaginal bleeding and vaginal discharge.   Musculoskeletal:  Negative for arthralgias, joint swelling and myalgias.   Skin:  Negative for rash.   Neurological:  Negative for dizziness, weakness, headaches and paresthesias.   Psychiatric/Behavioral:  Negative for mood changes. The patient is not nervous/anxious.         OBJECTIVE:   /86   Pulse 55   Temp 98.3  F (36.8  C) (Temporal)   Resp 16   Ht 1.657 m (5' 5.25\")   Wt 123.2 kg (271 lb 8 oz)   SpO2 99%   BMI 44.83 kg/m    Physical Exam  Constitutional:       General: She is not in acute distress.     Appearance: She is well-developed.   HENT:      Right Ear: External ear normal. No middle ear effusion. There is no impacted cerumen. Tympanic membrane is not injected, perforated, erythematous or bulging.      Left Ear: External ear normal.  No middle ear effusion. There is no impacted cerumen. Tympanic membrane is not injected, perforated, erythematous or bulging.      Nose: Nose normal. No mucosal edema or rhinorrhea.      Mouth/Throat:      Dentition: Normal dentition.      Tongue: No lesions. Tongue does not deviate from midline.      Palate: No lesions.      Pharynx: No pharyngeal swelling, oropharyngeal exudate or posterior oropharyngeal erythema.      Tonsils: No tonsillar exudate or tonsillar abscesses.   Eyes:      General: Lids are normal.         Right eye: No discharge.         Left eye: No discharge.      Conjunctiva/sclera: Conjunctivae normal.      Right eye: Right conjunctiva is not injected.      Left eye: Left conjunctiva is not injected.      Pupils: Pupils are equal, round, and reactive to light.   Neck:      Thyroid: No thyroid mass or thyromegaly.      Vascular: No JVD.      Trachea: Trachea normal. No tracheal deviation.   Cardiovascular:      Rate and Rhythm: " Normal rate and regular rhythm.      Heart sounds: Normal heart sounds. No murmur heard.     No friction rub. No gallop.   Pulmonary:      Effort: Pulmonary effort is normal. No respiratory distress.      Breath sounds: Normal breath sounds. No stridor or decreased air movement. No wheezing, rhonchi or rales.   Abdominal:      General: Bowel sounds are normal. There is no distension.      Palpations: Abdomen is soft. There is no mass.      Tenderness: There is no abdominal tenderness. There is no guarding or rebound.      Hernia: No hernia is present.   Musculoskeletal:         General: Normal range of motion.      Cervical back: Normal range of motion and neck supple.   Lymphadenopathy:      Cervical: No cervical adenopathy.   Skin:     General: Skin is warm and dry.   Neurological:      Mental Status: She is alert and oriented to person, place, and time.   Psychiatric:         Behavior: Behavior normal.         Thought Content: Thought content normal.         Judgment: Judgment normal.     Pelvic not indicated   Breast declined by patient       Diagnostic Test Results:  Labs reviewed in Epic  See orders pending in Epic     ASSESSMENT/PLAN:       ICD-10-CM    1. Routine general medical examination at a health care facility  Z00.00       2. Need for hepatitis B booster vaccination  Z23 HEPATITIS B, ADULT 20+ (ENGERIX-B/RECOMBIVAX HB)      3. Screening for lipid disorders  Z13.220 Lipid panel reflex to direct LDL Fasting     Lipid panel reflex to direct LDL Fasting      4. Screening for diabetes mellitus  Z13.1 Glucose     Glucose      5. BLAINE (generalized anxiety disorder)  F41.1 propranolol (INDERAL) 20 MG tablet     citalopram (CELEXA) 10 MG tablet     OFFICE/OUTPT VISIT,EST,LEVL III      6. Panic attack  F41.0 propranolol (INDERAL) 20 MG tablet     citalopram (CELEXA) 10 MG tablet     OFFICE/OUTPT VISIT,EST,LEVL III      7. Family history of colon cancer  Z80.0 omeprazole (PRILOSEC) 20 MG DR capsule      8.  "Gastroesophageal reflux disease with esophagitis, unspecified whether hemorrhage  K21.00 omeprazole (PRILOSEC) 20 MG DR capsule     famotidine (PEPCID) 40 MG tablet     OFFICE/OUTPT VISIT,EST,LEVL III      9. Family history of abdominal aortic aneurysm (AAA)  Z82.49 US Abdominal Aorta Imaging     OFFICE/OUTPT VISIT,EST,LEVL III        Patient has been advised of split billing requirements and indicates understanding: Yes    - GERD      Normal EGD in 2021. Been on PPI since then, Omeprazole 20 mg once daily      Discussed risks of long term PPI use      Recommend trial of H2RA - discussed use and side effects      Will refill PPI in case needs, reviewed use and side effects     If returns to this, should go onto calcium supplement     - Mood      Stable on current regimen      Reviewed both use and side effects, refilled     - Will update screening labs today  - Results will be communicated to patient when available and appropriate medication changes made if necessary     COUNSELING:  Reviewed preventive health counseling, as reflected in patient instructions  Special attention given to:        Regular exercise       Healthy diet/nutrition       Immunizations  Vaccinated for: Hepatitis B        BMI:   Estimated body mass index is 44.83 kg/m  as calculated from the following:    Height as of this encounter: 1.657 m (5' 5.25\").    Weight as of this encounter: 123.2 kg (271 lb 8 oz).   Weight management plan: Discussed healthy diet and exercise guidelines      She reports that she has quit smoking. Her smoking use included cigarettes. She has a 2.50 pack-year smoking history. She has never used smokeless tobacco.      Review of the result(s) of each unique test - See list        9/13/22 - CMP, Lipid, PAP       7/27/21 - EGD, colonoscopy       9/22/20 - US Abd Aorta screen   Diagnosis or treatment significantly limited by social determinants of health - None     30 minutes spent on the date of the encounter doing chart " review, history and exam, documentation and further activities as noted above      Erica Thomas PA-C  [facility]     Olumiant Counseling: I discussed with the patient the risks of Olumiant therapy including but not limited to upper respiratory tract infections, shingles, cold sores, and nausea. Live vaccines should be avoided.  This medication has been linked to serious infections; higher rate of mortality; malignancy and lymphoproliferative disorders; major adverse cardiovascular events; thrombosis; gastrointestinal perforations; neutropenia; lymphopenia; anemia; liver enzyme elevations; and lipid elevations.

## 2023-10-10 NOTE — RESULT ENCOUNTER NOTE
Farheen Sanchez    Your results show improvement in cholesterol, almost down to normal range. Only 1 point elevation in glucose. We will continue to monitor these yearly.     The results are attached for your review.       Vaibhav Thomas PA-C

## 2023-10-11 ENCOUNTER — E-VISIT (OUTPATIENT)
Dept: FAMILY MEDICINE | Facility: OTHER | Age: 43
End: 2023-10-11
Payer: COMMERCIAL

## 2023-10-11 DIAGNOSIS — E66.01 MORBID OBESITY (H): Primary | ICD-10-CM

## 2023-10-11 PROCEDURE — 99422 OL DIG E/M SVC 11-20 MIN: CPT | Performed by: PHYSICIAN ASSISTANT

## 2023-10-11 NOTE — TELEPHONE ENCOUNTER
Provider E-Visit time total (minutes): 15    Vaibhav Thomas PA-C  [facility]

## 2023-10-16 ENCOUNTER — ANCILLARY PROCEDURE (OUTPATIENT)
Dept: ULTRASOUND IMAGING | Facility: OTHER | Age: 43
End: 2023-10-16
Attending: PHYSICIAN ASSISTANT
Payer: COMMERCIAL

## 2023-10-16 DIAGNOSIS — Z82.49 FAMILY HISTORY OF ABDOMINAL AORTIC ANEURYSM (AAA): ICD-10-CM

## 2023-10-16 PROCEDURE — 76775 US EXAM ABDO BACK WALL LIM: CPT | Mod: TC | Performed by: RADIOLOGY

## 2023-10-16 NOTE — RESULT ENCOUNTER NOTE
Farheen Sanchez    Your ultrasound results were normal.     The results are attached for your review.       Vaibhav Thomas PA-C

## 2023-11-22 ENCOUNTER — PATIENT OUTREACH (OUTPATIENT)
Dept: GASTROENTEROLOGY | Facility: CLINIC | Age: 43
End: 2023-11-22
Payer: COMMERCIAL

## 2024-05-23 ENCOUNTER — VIRTUAL VISIT (OUTPATIENT)
Dept: FAMILY MEDICINE | Facility: OTHER | Age: 44
End: 2024-05-23
Payer: COMMERCIAL

## 2024-05-23 DIAGNOSIS — E66.01 MORBID OBESITY (H): Primary | ICD-10-CM

## 2024-05-23 PROCEDURE — 99442 PR PHYSICIAN TELEPHONE EVALUATION 11-20 MIN: CPT | Mod: 93 | Performed by: PHYSICIAN ASSISTANT

## 2024-05-23 RX ORDER — PHENTERMINE HYDROCHLORIDE 15 MG/1
15 CAPSULE ORAL EVERY MORNING
Qty: 30 CAPSULE | Refills: 0 | Status: SHIPPED | OUTPATIENT
Start: 2024-05-23

## 2024-05-23 NOTE — PATIENT INSTRUCTIONS
Medications   Phentermine - only 12 weeks, can cause irregular heartbeats, anxiety, sweating, insomnia   Topamax - anti-seizure, 1 or 2 times a day, same side effects possible as Phentermine, but usually less severe   Qsymia - combination of Phentermine and Topamax   Wellbutrin - antidepressant, helps feel full   Naltrexone - withdrawal medication, helps with metabolism  Contrave - combination of Wellbutrin and Naltrexone   GLP-1 injections - Wegovy (nation wide shortage of this), Zepbound, and Saxenda         Nausea, upset, stomach, vomiting common after injection         Improves with time         Long term - constipation         All require 1 month check ins as doses are increased

## 2024-05-23 NOTE — PROGRESS NOTES
"Laura is a 43 year old who is being evaluated via a billable telephone visit.    What phone number would you like to be contacted at? [phone]   How would you like to obtain your AVS? MyChart      Assessment & Plan     ICD-10-CM    1. Morbid obesity (H)  E66.01 phentermine 15 MG capsule     Adult Comprehensive Weight Management  Referral        - Discussed all medications in patient instructions at length      This was given to patient previously in E-visit       Reports insurance will cover phentermine, rest is prior auth   - Will start Phentermine at 15 mg   - Reviewed use and side effects       reviewed, no concerns and no fills   - Recheck monthly     Review of the result(s) of each unique test - None     Diagnosis or treatment significantly limited by social determinants of health - None     17 minutes spent on the date of the encounter doing chart review, history and exam, documentation and further activities as noted above    The patient indicates understanding of these issues and agrees with the plan.    Follow up: 1 month     Vaibhav Farrell-RIGO Friedman  [facility]   Laura is a 43 year old, presenting for the following health issues:  Medication Request        5/23/2024    11:40 AM   Additional Questions   Roomed by augustin   Accompanied by self     History of Present Illness       Reason for visit:  Weight loss help    She eats 2-3 servings of fruits and vegetables daily.She consumes 1 sweetened beverage(s) daily.She exercises with enough effort to increase her heart rate 9 or less minutes per day.  She exercises with enough effort to increase her heart rate 3 or less days per week.   She is taking medications regularly.       Discuss medication for weight loss management     Current weight and height - 5'4\" 270 lbs   Exercise - Trying to work out on Elliptical but back hurts so can't do it much   Diet - tries but then falls off the wagon     - " Lose weight and then does good and comes right back  - Whole life has struggled     - Phentermine is covered   - Wegovy covered but prior auth      Rest of list say that         Review of Systems  Constitutional, HEENT, cardiovascular, pulmonary, gi and gu systems are negative, except as otherwise noted.      Objective       Vitals:  No vitals were obtained today due to virtual visit.    Physical Exam   GENERAL: alert and no distress  EYES: Eyes grossly normal to inspection.  No discharge or erythema, or obvious scleral/conjunctival abnormalities.  RESP: No audible wheeze, cough, or visible cyanosis.    SKIN: Visible skin clear. No significant rash, abnormal pigmentation or lesions.  NEURO: Cranial nerves grossly intact.  Mentation and speech appropriate for age.  PSYCH: Appropriate affect, tone, and pace of words    Diagnostics: none         Scheduled as telephone visit. Duration 13 minutes

## 2024-08-22 ENCOUNTER — PATIENT OUTREACH (OUTPATIENT)
Dept: CARE COORDINATION | Facility: CLINIC | Age: 44
End: 2024-08-22
Payer: COMMERCIAL

## 2024-09-17 ENCOUNTER — PATIENT OUTREACH (OUTPATIENT)
Dept: CARE COORDINATION | Facility: CLINIC | Age: 44
End: 2024-09-17
Payer: COMMERCIAL

## 2024-09-19 ENCOUNTER — PATIENT OUTREACH (OUTPATIENT)
Dept: CARE COORDINATION | Facility: CLINIC | Age: 44
End: 2024-09-19
Payer: COMMERCIAL

## 2024-10-09 SDOH — HEALTH STABILITY: PHYSICAL HEALTH: ON AVERAGE, HOW MANY DAYS PER WEEK DO YOU ENGAGE IN MODERATE TO STRENUOUS EXERCISE (LIKE A BRISK WALK)?: 7 DAYS

## 2024-10-09 SDOH — HEALTH STABILITY: PHYSICAL HEALTH: ON AVERAGE, HOW MANY MINUTES DO YOU ENGAGE IN EXERCISE AT THIS LEVEL?: 30 MIN

## 2024-10-09 ASSESSMENT — ANXIETY QUESTIONNAIRES
5. BEING SO RESTLESS THAT IT IS HARD TO SIT STILL: NOT AT ALL
GAD7 TOTAL SCORE: 0
6. BECOMING EASILY ANNOYED OR IRRITABLE: NOT AT ALL
7. FEELING AFRAID AS IF SOMETHING AWFUL MIGHT HAPPEN: NOT AT ALL
1. FEELING NERVOUS, ANXIOUS, OR ON EDGE: NOT AT ALL
3. WORRYING TOO MUCH ABOUT DIFFERENT THINGS: NOT AT ALL
GAD7 TOTAL SCORE: 0
7. FEELING AFRAID AS IF SOMETHING AWFUL MIGHT HAPPEN: NOT AT ALL
2. NOT BEING ABLE TO STOP OR CONTROL WORRYING: NOT AT ALL
GAD7 TOTAL SCORE: 0
4. TROUBLE RELAXING: NOT AT ALL

## 2024-10-09 ASSESSMENT — SOCIAL DETERMINANTS OF HEALTH (SDOH): HOW OFTEN DO YOU GET TOGETHER WITH FRIENDS OR RELATIVES?: ONCE A WEEK

## 2024-10-10 ENCOUNTER — OFFICE VISIT (OUTPATIENT)
Dept: FAMILY MEDICINE | Facility: OTHER | Age: 44
End: 2024-10-10
Attending: PHYSICIAN ASSISTANT
Payer: COMMERCIAL

## 2024-10-10 VITALS
SYSTOLIC BLOOD PRESSURE: 104 MMHG | BODY MASS INDEX: 45.58 KG/M2 | HEART RATE: 60 BPM | RESPIRATION RATE: 20 BRPM | TEMPERATURE: 98.8 F | HEIGHT: 64 IN | WEIGHT: 267 LBS | DIASTOLIC BLOOD PRESSURE: 64 MMHG | OXYGEN SATURATION: 97 %

## 2024-10-10 DIAGNOSIS — F41.0 PANIC ATTACK: ICD-10-CM

## 2024-10-10 DIAGNOSIS — K21.00 GASTROESOPHAGEAL REFLUX DISEASE WITH ESOPHAGITIS, UNSPECIFIED WHETHER HEMORRHAGE: ICD-10-CM

## 2024-10-10 DIAGNOSIS — Z12.31 VISIT FOR SCREENING MAMMOGRAM: ICD-10-CM

## 2024-10-10 DIAGNOSIS — Z23 NEED FOR VACCINATION AGAINST HEPATITIS B VIRUS: ICD-10-CM

## 2024-10-10 DIAGNOSIS — Z00.00 ROUTINE GENERAL MEDICAL EXAMINATION AT A HEALTH CARE FACILITY: Primary | ICD-10-CM

## 2024-10-10 DIAGNOSIS — E78.9 BORDERLINE HIGH CHOLESTEROL: ICD-10-CM

## 2024-10-10 DIAGNOSIS — Z13.1 SCREENING FOR DIABETES MELLITUS: ICD-10-CM

## 2024-10-10 DIAGNOSIS — F41.1 GAD (GENERALIZED ANXIETY DISORDER): ICD-10-CM

## 2024-10-10 PROBLEM — R10.2 PELVIC CRAMPING: Status: RESOLVED | Noted: 2021-07-22 | Resolved: 2024-10-10

## 2024-10-10 PROBLEM — R30.0 DYSURIA: Status: RESOLVED | Noted: 2021-07-22 | Resolved: 2024-10-10

## 2024-10-10 PROBLEM — L85.3 DRY SKIN: Status: RESOLVED | Noted: 2021-07-22 | Resolved: 2024-10-10

## 2024-10-10 PROBLEM — L65.9 HAIR LOSS: Status: RESOLVED | Noted: 2021-07-22 | Resolved: 2024-10-10

## 2024-10-10 PROBLEM — R39.15 URINARY URGENCY: Status: RESOLVED | Noted: 2021-07-22 | Resolved: 2024-10-10

## 2024-10-10 LAB
CHOLEST SERPL-MCNC: 172 MG/DL
FASTING STATUS PATIENT QL REPORTED: YES
FASTING STATUS PATIENT QL REPORTED: YES
GLUCOSE SERPL-MCNC: 104 MG/DL (ref 70–99)
HDLC SERPL-MCNC: 39 MG/DL
LDLC SERPL CALC-MCNC: 107 MG/DL
NONHDLC SERPL-MCNC: 133 MG/DL
TRIGL SERPL-MCNC: 129 MG/DL

## 2024-10-10 PROCEDURE — 82947 ASSAY GLUCOSE BLOOD QUANT: CPT | Performed by: PHYSICIAN ASSISTANT

## 2024-10-10 PROCEDURE — 80061 LIPID PANEL: CPT | Performed by: PHYSICIAN ASSISTANT

## 2024-10-10 PROCEDURE — 99396 PREV VISIT EST AGE 40-64: CPT | Mod: 25 | Performed by: PHYSICIAN ASSISTANT

## 2024-10-10 PROCEDURE — 96127 BRIEF EMOTIONAL/BEHAV ASSMT: CPT | Performed by: PHYSICIAN ASSISTANT

## 2024-10-10 PROCEDURE — 36415 COLL VENOUS BLD VENIPUNCTURE: CPT | Performed by: PHYSICIAN ASSISTANT

## 2024-10-10 PROCEDURE — 90746 HEPB VACCINE 3 DOSE ADULT IM: CPT | Performed by: PHYSICIAN ASSISTANT

## 2024-10-10 PROCEDURE — 90471 IMMUNIZATION ADMIN: CPT | Performed by: PHYSICIAN ASSISTANT

## 2024-10-10 RX ORDER — PROPRANOLOL HCL 20 MG
20 TABLET ORAL DAILY
Qty: 90 TABLET | Refills: 3 | Status: SHIPPED | OUTPATIENT
Start: 2024-10-10

## 2024-10-10 RX ORDER — CITALOPRAM HYDROBROMIDE 10 MG/1
10 TABLET ORAL DAILY
Qty: 90 TABLET | Refills: 3 | Status: SHIPPED | OUTPATIENT
Start: 2024-10-10

## 2024-10-10 RX ORDER — FAMOTIDINE 40 MG/1
40 TABLET, FILM COATED ORAL DAILY
Qty: 90 TABLET | Refills: 3 | Status: SHIPPED | OUTPATIENT
Start: 2024-10-10

## 2024-10-10 ASSESSMENT — PAIN SCALES - GENERAL: PAINLEVEL: NO PAIN (0)

## 2024-10-10 NOTE — PROGRESS NOTES
Prior to immunization administration, verified patients identity using patient s name and date of birth. Please see Immunization Activity for additional information.     Screening Questionnaire for Adult Immunization    Are you sick today?   No   Do you have allergies to medications, food, a vaccine component or latex?   No   Have you ever had a serious reaction after receiving a vaccination?   No   Do you have a long-term health problem with heart, lung, kidney, or metabolic disease (e.g., diabetes), asthma, a blood disorder, no spleen, complement component deficiency, a cochlear implant, or a spinal fluid leak?  Are you on long-term aspirin therapy?   No   Do you have cancer, leukemia, HIV/AIDS, or any other immune system problem?   No   Do you have a parent, brother, or sister with an immune system problem?   No   In the past 3 months, have you taken medications that affect  your immune system, such as prednisone, other steroids, or anticancer drugs; drugs for the treatment of rheumatoid arthritis, Crohn s disease, or psoriasis; or have you had radiation treatments?   No   Have you had a seizure, or a brain or other nervous system problem?   No   During the past year, have you received a transfusion of blood or blood    products, or been given immune (gamma) globulin or antiviral drug?   No   For women: Are you pregnant or is there a chance you could become       pregnant during the next month?   No   Have you received any vaccinations in the past 4 weeks?   No     Immunization questionnaire answers were all negative.      Patient instructed to remain in clinic for 15 minutes afterwards, and to report any adverse reactions.     Screening performed by Mariola Murillo CMA on 10/10/2024 at 7:37 AM.

## 2024-10-10 NOTE — PATIENT INSTRUCTIONS
Patient Education   Preventive Care Advice   This is general advice given by our system to help you stay healthy. However, your care team may have specific advice just for you. Please talk to your care team about your preventive care needs.  Nutrition  Eat 5 or more servings of fruits and vegetables each day.  Try wheat bread, brown rice and whole grain pasta (instead of white bread, rice, and pasta).  Get enough calcium and vitamin D. Check the label on foods and aim for 100% of the RDA (recommended daily allowance).  Lifestyle  Exercise at least 150 minutes each week  (30 minutes a day, 5 days a week).  Do muscle strengthening activities 2 days a week. These help control your weight and prevent disease.  No smoking.  Wear sunscreen to prevent skin cancer.  Have a dental exam and cleaning every 6 months.  Yearly exams  See your health care team every year to talk about:  Any changes in your health.  Any medicines your care team has prescribed.  Preventive care, family planning, and ways to prevent chronic diseases.  Shots (vaccines)   HPV shots (up to age 26), if you've never had them before.  Hepatitis B shots (up to age 59), if you've never had them before.  COVID-19 shot: Get this shot when it's due.  Flu shot: Get a flu shot every year.  Tetanus shot: Get a tetanus shot every 10 years.  Pneumococcal, hepatitis A, and RSV shots: Ask your care team if you need these based on your risk.  Shingles shot (for age 50 and up)  General health tests  Diabetes screening:  Starting at age 35, Get screened for diabetes at least every 3 years.  If you are younger than age 35, ask your care team if you should be screened for diabetes.  Cholesterol test: At age 39, start having a cholesterol test every 5 years, or more often if advised.  Bone density scan (DEXA): At age 50, ask your care team if you should have this scan for osteoporosis (brittle bones).  Hepatitis C: Get tested at least once in your life.  STIs (sexually  transmitted infections)  Before age 24: Ask your care team if you should be screened for STIs.  After age 24: Get screened for STIs if you're at risk. You are at risk for STIs (including HIV) if:  You are sexually active with more than one person.  You don't use condoms every time.  You or a partner was diagnosed with a sexually transmitted infection.  If you are at risk for HIV, ask about PrEP medicine to prevent HIV.  Get tested for HIV at least once in your life, whether you are at risk for HIV or not.  Cancer screening tests  Cervical cancer screening: If you have a cervix, begin getting regular cervical cancer screening tests starting at age 21.  Breast cancer scan (mammogram): If you've ever had breasts, begin having regular mammograms starting at age 40. This is a scan to check for breast cancer.  Colon cancer screening: It is important to start screening for colon cancer at age 45.  Have a colonoscopy test every 10 years (or more often if you're at risk) Or, ask your provider about stool tests like a FIT test every year or Cologuard test every 3 years.  To learn more about your testing options, visit:   .  For help making a decision, visit:   https://bit.ly/ab84468.  Prostate cancer screening test: If you have a prostate, ask your care team if a prostate cancer screening test (PSA) at age 55 is right for you.  Lung cancer screening: If you are a current or former smoker ages 50 to 80, ask your care team if ongoing lung cancer screenings are right for you.  For informational purposes only. Not to replace the advice of your health care provider. Copyright   2023 [email or web address] All rights reserved. Clinically reviewed by the [facility] Transitions Program. PushCall [phone] - REV 01/24.

## 2024-10-10 NOTE — PROGRESS NOTES
"Preventive Care Visit  [facility]  Erica ADDIE Farrell-RIGO Friedman, Family Medicine  Oct 10, 2024    Assessment & Plan     ICD-10-CM    1. Routine general medical examination at a health care facility  Z00.00       2. Visit for screening mammogram  Z12.31 MA Screening Bilateral w/ Rudy      3. BLAINE (generalized anxiety disorder)  F41.1 citalopram (CELEXA) 10 MG tablet     propranolol (INDERAL) 20 MG tablet      4. Panic attack  F41.0 citalopram (CELEXA) 10 MG tablet     propranolol (INDERAL) 20 MG tablet      5. Gastroesophageal reflux disease with esophagitis, unspecified whether hemorrhage  K21.00 famotidine (PEPCID) 40 MG tablet      6. Need for vaccination against hepatitis B virus  Z23 HEPATITIS B VACCINE (20 YEARS AND OLDER) (ENGERIX-B/RECOMBIVAX)        Patient has been advised of split billing requirements and indicates understanding: Yes    BMI  Estimated body mass index is 45.31 kg/m  as calculated from the following:    Height as of this encounter: 1.635 m (5' 4.37\").    Weight as of this encounter: 121.1 kg (267 lb).   Weight management plan: Discussed healthy diet and exercise guidelines    Counseling  Appropriate preventive services were addressed with this patient via screening, questionnaire, or discussion as appropriate for fall prevention, nutrition, physical activity, Tobacco-use cessation, social engagement, weight loss and cognition.  Checklist reviewing preventive services available has been given to the patient.  Reviewed patient's diet, addressing concerns and/or questions.     2. Patient to schedule mammogram     3. Immunization discussed and given     4. Screening lab discussed, await results, fasting today    5. Cholesterol  - Borderline 2 years ago, improved last year   - Recommend continued monitoring     6 & 7. Mood  - Stable on current regimen of Celexa and Inderal   - Reviewed both use and side effects, refilled     8. Reviewed medication use and side " effects         Review of the result(s) of each unique test - See list         10/10/23 - labs       9/21/22 - Mammo     9/13/22 - labs & pap   Diagnosis or treatment significantly limited by social determinants of health - None     20 minutes spent on the date of the encounter doing chart review, history and exam, documentation and further activities as noted above    The patient indicates understanding of these issues and agrees with the plan.    Follow up: 1 year     Vaibhav Farrell-RIGO Friedman  [facility] - Zachery Sanchez is a 44 year old, presenting for the following:  Physical        10/10/2024     6:55 AM   Additional Questions   Roomed by Avis   Accompanied by Self         10/10/2024     6:55 AM   Patient Reported Additional Medications   Patient reports taking the following new medications NA        Health Care Directive  Patient does not have a Health Care Directive or Living Will: Discussed advance care planning with patient; however, patient declined at this time.    HPI  Anxiety   How are you doing with your anxiety since your last visit? No change  Are you having other symptoms that might be associated with anxiety? No  Have you had a significant life event? No   Are you feeling depressed? No  Do you have any concerns with your use of alcohol or other drugs? No    - Phentermine gave constipation so stopped it   - Otherwise doing well   - Recently started exercising 30 min per day      Watching her calorie intake   - Anxiety seems to be well controlled   - Continues on Pepcid, helpful       Social History     Tobacco Use    Smoking status: Former     Current packs/day: 0.50     Average packs/day: 0.5 packs/day for 5.0 years (2.5 ttl pk-yrs)     Types: Cigarettes    Smokeless tobacco: Never   Vaping Use    Vaping status: Never Used   Substance Use Topics    Alcohol use: Yes     Comment: 0-2 per week     Drug use: No         5/27/2021     3:37 PM 10/9/2023     9:40  AM 10/9/2024     7:50 AM   LBAINE-7 SCORE   Total Score 3 (minimal anxiety) 0 (minimal anxiety) 0 (minimal anxiety)   Total Score 3 0 0         8/24/2020     1:15 PM 8/25/2020     1:47 PM 5/27/2021     3:35 PM   PHQ   PHQ-9 Total Score 21 15 4   Q9: Thoughts of better off dead/self-harm past 2 weeks Not at all Not at all Not at all         5/27/2021     3:35 PM   Last PHQ-9   1.  Little interest or pleasure in doing things 0   2.  Feeling down, depressed, or hopeless 0   3.  Trouble falling or staying asleep, or sleeping too much 1   4.  Feeling tired or having little energy 1   5.  Poor appetite or overeating 1   6.  Feeling bad about yourself 0   7.  Trouble concentrating 1   8.  Moving slowly or restless 0   Q9: Thoughts of better off dead/self-harm past 2 weeks 0   PHQ-9 Total Score 4         10/9/2024     7:50 AM   BLAINE-7    1. Feeling nervous, anxious, or on edge 0   2. Not being able to stop or control worrying 0   3. Worrying too much about different things 0   4. Trouble relaxing 0   5. Being so restless that it is hard to sit still 0   6. Becoming easily annoyed or irritable 0   7. Feeling afraid, as if something awful might happen 0   BLAINE-7 Total Score 0           10/9/2024   General Health   How would you rate your overall physical health? Good   Feel stress (tense, anxious, or unable to sleep) Not at all            10/9/2024   Nutrition   Three or more servings of calcium each day? Yes   Diet: Carbohydrate counting   How many servings of fruit and vegetables per day? (!) 2-3   How many sweetened beverages each day? 0-1            10/9/2024   Exercise   Days per week of moderate/strenous exercise 7 days   Average minutes spent exercising at this level 30 min            10/9/2024   Social Factors   Frequency of gathering with friends or relatives Once a week   Worry food won't last until get money to buy more No   Food not last or not have enough money for food? No   Do you have housing? (Housing is defined  as stable permanent housing and does not include staying ouside in a car, in a tent, in an abandoned building, in an overnight shelter, or couch-surfing.) Yes   Are you worried about losing your housing? No   Lack of transportation? No   Unable to get utilities (heat,electricity)? No            10/9/2024   Dental   Dentist two times every year? Yes            10/9/2024   TB Screening   Were you born outside of the [place]? No            Today's PHQ-2 Score:       10/9/2024     7:55 AM   PHQ-2 ( 1999 Pfizer)   Q1: Little interest or pleasure in doing things 0   Q2: Feeling down, depressed or hopeless 0   PHQ-2 Score 0   Q1: Little interest or pleasure in doing things Not at all   Q2: Feeling down, depressed or hopeless Not at all   PHQ-2 Score 0           10/9/2024   Substance Use   Alcohol more than 3/day or more than 7/wk No   Do you use any other substances recreationally? No        Social History     Tobacco Use    Smoking status: Former     Current packs/day: 0.50     Average packs/day: 0.5 packs/day for 5.0 years (2.5 ttl pk-yrs)     Types: Cigarettes    Smokeless tobacco: Never   Vaping Use    Vaping status: Never Used   Substance Use Topics    Alcohol use: Yes     Comment: 0-2 per week     Drug use: No           9/21/2022   LAST FHS-7 RESULTS   1st degree relative breast or ovarian cancer No   Any relative bilateral breast cancer No   Any male have breast cancer No   Any ONE woman have BOTH breast AND ovarian cancer No   Any woman with breast cancer before 50yrs No   2 or more relatives with breast AND/OR ovarian cancer No   2 or more relatives with breast AND/OR bowel cancer Yes        Mammogram Screening - Mammogram every 1-2 years updated in Health Maintenance based on mutual decision making        10/9/2024   STI Screening   New sexual partner(s) since last STI/HIV test? No        History of abnormal Pap smear: No - age 30- 64 PAP with HPV every 5 years recommended        Latest Ref Rng & Units 9/13/2022      8:44 AM 9/13/2021     3:45 PM 8/5/2020     4:30 PM   PAP / HPV   PAP  Negative for Intraepithelial Lesion or Malignancy (NILM)  Negative for Intraepithelial Lesion or Malignancy (NILM)     HPV 16 DNA Negative Negative  Negative  Negative    HPV 18 DNA Negative Negative  Negative  Negative    Other HR HPV Negative Negative  Negative  Positive      ASCVD Risk   The 10-year ASCVD risk score (Leena VEGA, et al., 2019) is: 0.7%    Values used to calculate the score:      Age: 44 years      Sex: Female      Is Non- : No      Diabetic: No      Tobacco smoker: No      Systolic Blood Pressure: 104 mmHg      Is BP treated: No      HDL Cholesterol: 43 mg/dL      Total Cholesterol: 192 mg/dL      Reviewed and updated as needed this visit by Provider   Tobacco  Allergies  Meds  Problems  Med Hx  Surg Hx  Fam Hx            Past Medical History:   Diagnosis Date    Anxiety     Cervical high risk HPV (human papillomavirus) test positive 08/05/2020    See problem list     Past Surgical History:   Procedure Laterality Date    COLONOSCOPY N/A 7/27/2021    Procedure: COLONOSCOPY;  Surgeon: Jose Evangelista DO;  Location:  GI    ESOPHAGOSCOPY, GASTROSCOPY, DUODENOSCOPY (EGD), COMBINED N/A 7/27/2021    Procedure: ESOPHAGOGASTRODUODENOSCOPY (EGD);  Surgeon: Jose Evangelista DO;  Location:  GI    TUBAL LIGATION  2006     Lab work is in process  Labs reviewed in EPIC  BP Readings from Last 3 Encounters:   10/10/24 104/64   10/10/23 120/86   12/19/22 110/72    Wt Readings from Last 3 Encounters:   10/10/24 121.1 kg (267 lb)   10/10/23 123.2 kg (271 lb 8 oz)   12/19/22 106.6 kg (235 lb)                Review of Systems  Constitutional, neuro, ENT, endocrine, pulmonary, cardiac, gastrointestinal, genitourinary, musculoskeletal, integument and psychiatric systems are negative, except as otherwise noted.     Objective    Exam  /64   Pulse 60   Temp 98.8  F (37.1  C) (Temporal)    "Resp 20   Ht 1.635 m (5' 4.37\")   Wt 121.1 kg (267 lb)   SpO2 97%   BMI 45.31 kg/m     Estimated body mass index is 45.31 kg/m  as calculated from the following:    Height as of this encounter: 1.635 m (5' 4.37\").    Weight as of this encounter: 121.1 kg (267 lb).    Physical Exam  Constitutional:       General: She is not in acute distress.     Appearance: She is well-developed.   HENT:      Right Ear: External ear normal. No middle ear effusion. There is no impacted cerumen. Tympanic membrane is not injected, perforated, erythematous or bulging.      Left Ear: External ear normal.  No middle ear effusion. There is no impacted cerumen. Tympanic membrane is not injected, perforated, erythematous or bulging.      Nose: Nose normal. No mucosal edema or rhinorrhea.      Mouth/Throat:      Dentition: Normal dentition.      Tongue: No lesions. Tongue does not deviate from midline.      Palate: No lesions.      Pharynx: No pharyngeal swelling, oropharyngeal exudate or posterior oropharyngeal erythema.      Tonsils: No tonsillar exudate or tonsillar abscesses.   Eyes:      General: Lids are normal.         Right eye: No discharge.         Left eye: No discharge.      Conjunctiva/sclera: Conjunctivae normal.      Right eye: Right conjunctiva is not injected.      Left eye: Left conjunctiva is not injected.      Pupils: Pupils are equal, round, and reactive to light.   Neck:      Thyroid: No thyroid mass or thyromegaly.      Vascular: No JVD.      Trachea: Trachea normal. No tracheal deviation.   Cardiovascular:      Rate and Rhythm: Normal rate and regular rhythm.      Heart sounds: Normal heart sounds. No murmur heard.     No friction rub. No gallop.   Pulmonary:      Effort: Pulmonary effort is normal. No respiratory distress.      Breath sounds: Normal breath sounds. No stridor or decreased air movement. No wheezing, rhonchi or rales.   Abdominal:      General: Bowel sounds are normal. There is no distension.      " Palpations: Abdomen is soft. There is no mass.      Tenderness: There is no abdominal tenderness. There is no guarding or rebound.      Hernia: No hernia is present.   Musculoskeletal:         General: Normal range of motion.      Cervical back: Normal range of motion and neck supple.   Lymphadenopathy:      Cervical: No cervical adenopathy.   Skin:     General: Skin is warm and dry.   Neurological:      Mental Status: She is alert and oriented to person, place, and time.   Psychiatric:         Behavior: Behavior normal.         Thought Content: Thought content normal.         Judgment: Judgment normal.     Declined breast exam  Pelvic not indicated       Diagnostics: reviewed in Epic, see orders pending in Epic       Signed Electronically by: Erica Thomas PA-C

## 2024-10-10 NOTE — RESULT ENCOUNTER NOTE
Farheen Sanchez    Your results were show the cholesterol improved, just barely high. Mild elevation in glucose we well keep an eye on.     The results are attached for your review.       Vaibhav Thomas PA-C

## 2025-01-29 ENCOUNTER — ALLIED HEALTH/NURSE VISIT (OUTPATIENT)
Dept: FAMILY MEDICINE | Facility: OTHER | Age: 45
End: 2025-01-29
Payer: COMMERCIAL

## 2025-01-29 DIAGNOSIS — Z23 NEED FOR HEPATITIS B BOOSTER VACCINATION: Primary | ICD-10-CM

## 2025-01-29 PROCEDURE — 90746 HEPB VACCINE 3 DOSE ADULT IM: CPT

## 2025-01-29 PROCEDURE — 90471 IMMUNIZATION ADMIN: CPT

## 2025-01-29 NOTE — PROGRESS NOTES
Prior to immunization administration, verified patients identity using patient s name and date of birth. Please see Immunization Activity for additional information.     Screening Questionnaire for Adult Immunization    Are you sick today?   No   Do you have allergies to medications, food, a vaccine component or latex?   No   Have you ever had a serious reaction after receiving a vaccination?   No   Do you have a long-term health problem with heart, lung, kidney, or metabolic disease (e.g., diabetes), asthma, a blood disorder, no spleen, complement component deficiency, a cochlear implant, or a spinal fluid leak?  Are you on long-term aspirin therapy?   No   Do you have cancer, leukemia, HIV/AIDS, or any other immune system problem?   No   Do you have a parent, brother, or sister with an immune system problem?   No   In the past 3 months, have you taken medications that affect  your immune system, such as prednisone, other steroids, or anticancer drugs; drugs for the treatment of rheumatoid arthritis, Crohn s disease, or psoriasis; or have you had radiation treatments?   No   Have you had a seizure, or a brain or other nervous system problem?   No   During the past year, have you received a transfusion of blood or blood    products, or been given immune (gamma) globulin or antiviral drug?   No   For women: Are you pregnant or is there a chance you could become       pregnant during the next month?   No   Have you received any vaccinations in the past 4 weeks?   No     Immunization questionnaire answers were all negative.    I have reviewed the following standing orders:   This patient is due and qualifies for the Hepatitis B vaccine.    Click here for Hepatitis B Standing Order    I have reviewed the vaccines inclusion and exclusion criteria; No concerns regarding eligibility.     Patient instructed to remain in clinic for 15 minutes afterwards, and to report any adverse reactions.     Screening performed by Joselyn  PORFIRIO Stephens MA on 1/29/2025 at 8:39 AM.

## 2025-02-05 ENCOUNTER — MYC MEDICAL ADVICE (OUTPATIENT)
Dept: FAMILY MEDICINE | Facility: OTHER | Age: 45
End: 2025-02-05
Payer: COMMERCIAL

## 2025-02-25 ENCOUNTER — VIRTUAL VISIT (OUTPATIENT)
Dept: URGENT CARE | Facility: CLINIC | Age: 45
End: 2025-02-25
Payer: COMMERCIAL

## 2025-02-25 ENCOUNTER — LAB (OUTPATIENT)
Dept: LAB | Facility: OTHER | Age: 45
End: 2025-02-25
Payer: COMMERCIAL

## 2025-02-25 DIAGNOSIS — N76.0 BV (BACTERIAL VAGINOSIS): Primary | ICD-10-CM

## 2025-02-25 DIAGNOSIS — N76.0 VAGINITIS AND VULVOVAGINITIS: ICD-10-CM

## 2025-02-25 DIAGNOSIS — N76.0 VAGINITIS AND VULVOVAGINITIS: Primary | ICD-10-CM

## 2025-02-25 DIAGNOSIS — B96.89 BV (BACTERIAL VAGINOSIS): Primary | ICD-10-CM

## 2025-02-25 LAB
CLUE CELLS: PRESENT
TRICHOMONAS, WET PREP: ABNORMAL
WBC'S/HIGH POWER FIELD, WET PREP: ABNORMAL
YEAST, WET PREP: ABNORMAL

## 2025-02-25 PROCEDURE — 98004 SYNCH AUDIO-VIDEO EST SF 10: CPT | Performed by: EMERGENCY MEDICINE

## 2025-02-25 PROCEDURE — 87210 SMEAR WET MOUNT SALINE/INK: CPT

## 2025-02-25 RX ORDER — METRONIDAZOLE 500 MG/1
500 TABLET ORAL 2 TIMES DAILY
Qty: 14 TABLET | Refills: 0 | Status: SHIPPED | OUTPATIENT
Start: 2025-02-25 | End: 2025-03-04

## 2025-02-25 NOTE — PROGRESS NOTES
Video visit:  Start time: 1:29 PM  Stop time: 1:33 PM  Duration: 4 minutes  Patient location: In vehicle  Provider location: [facility] virtual provider (remote).  Platform used for video visit: [facility]        CHIEF COMPLAINT: Vaginal irritation.      HPI: Patient is a 44-year-old female who for several weeks has had some slight vaginal irritation and foul-smelling odor.  It seemed to get worse recently after having intercourse.  Patient does have an IUD in place.  No abdominal pain.  No concern for lubricants, condoms or any if it may have caused a chemical irritation.      ROS: See HPI otherwise normal.    No Known Allergies   Current Outpatient Medications   Medication Sig Dispense Refill    citalopram (CELEXA) 10 MG tablet Take 1 tablet (10 mg) by mouth daily. 90 tablet 3    famotidine (PEPCID) 40 MG tablet Take 1 tablet (40 mg) by mouth daily. 90 tablet 3    Omega-3 Fatty Acids (FISH OIL) 1200 MG capsule Take 1,200 mg by mouth daily      propranolol (INDERAL) 20 MG tablet Take 1 tablet (20 mg) by mouth daily. 90 tablet 3         PE: No acute distress on video visit.  Patient is alert and oriented.  She is nondyspneic appearing speaking full sentences.        TREATMENT: None.      ASSESSMENT: Vaginitis of unclear etiology with no complicating history or symptoms      DIAGNOSIS: Vaginitis.      PLAN: Wet prep ordered.  Will follow and treat as needed.

## 2025-02-27 ENCOUNTER — MYC MEDICAL ADVICE (OUTPATIENT)
Dept: FAMILY MEDICINE | Facility: OTHER | Age: 45
End: 2025-02-27
Payer: COMMERCIAL

## 2025-02-27 DIAGNOSIS — B96.89 BV (BACTERIAL VAGINOSIS): Primary | ICD-10-CM

## 2025-02-27 DIAGNOSIS — N76.0 BV (BACTERIAL VAGINOSIS): Primary | ICD-10-CM

## 2025-02-27 RX ORDER — METRONIDAZOLE 7.5 MG/G
1 GEL VAGINAL AT BEDTIME
Qty: 35 G | Refills: 0 | Status: SHIPPED | OUTPATIENT
Start: 2025-02-27 | End: 2025-03-06

## 2025-06-05 ENCOUNTER — E-VISIT (OUTPATIENT)
Dept: URGENT CARE | Facility: CLINIC | Age: 45
End: 2025-06-05
Payer: COMMERCIAL

## 2025-06-05 DIAGNOSIS — J01.90 ACUTE SINUSITIS, RECURRENCE NOT SPECIFIED, UNSPECIFIED LOCATION: Primary | ICD-10-CM

## 2025-06-05 NOTE — PATIENT INSTRUCTIONS
Acute Sinusitis: Care Instructions  Overview     Acute sinusitis is an inflammation of the mucous membranes inside the nose and sinuses. Sinuses are the hollow spaces in your skull around the eyes and nose. Acute sinusitis often follows a cold. Acute sinusitis causes thick, discolored mucus that drains from the nose or down the back of the throat. It also can cause pain and pressure in your head and face along with a stuffy or blocked nose.  In most cases, sinusitis gets better on its own in 1 to 2 weeks. But some mild symptoms may last for several weeks. Sometimes antibiotics are needed if there is a bacterial infection.  Follow-up care is a key part of your treatment and safety. Be sure to make and go to all appointments, and call your doctor if you are having problems. It's also a good idea to know your test results and keep a list of the medicines you take.  How can you care for yourself at home?  Use saline (saltwater) nasal washes. This can help keep your nasal passages open and wash out mucus and allergens.  You can buy saline nose washes at a grocery store or drugstore. Follow the instructions on the package.  You can make your own at home. Add 1 teaspoon of non-iodized salt and 1 teaspoon of baking soda to 2 cups of distilled or boiled and cooled water. Fill a squeeze bottle or a nasal cleansing pot (such as a neti pot) with the nasal wash. Then put the tip into your nostril, and lean over the sink. With your mouth open, gently squirt the liquid. Repeat on the other side.  Try a decongestant nasal spray like oxymetazoline (Afrin). Do not use it for more than 3 days in a row. Using it for more than 3 days can make your congestion worse.  If needed, take an over-the-counter pain medicine, such as acetaminophen (Tylenol), ibuprofen (Advil, Motrin), or naproxen (Aleve). Read and follow all instructions on the label.  If the doctor prescribed antibiotics, take them as directed. Do not stop taking them just  "because you feel better. You need to take the full course of antibiotics.  Be careful when taking over-the-counter cold or flu medicines and Tylenol at the same time. Many of these medicines have acetaminophen, which is Tylenol. Read the labels to make sure that you are not taking more than the recommended dose. Too much acetaminophen (Tylenol) can be harmful.  Try a steroid nasal spray. It may help with your symptoms.  Breathe warm, moist air. You can use a steamy shower, a hot bath, or a sink filled with hot water. Avoid cold, dry air. Using a humidifier in your home may help. Follow the directions for cleaning the machine.  When should you call for help?   Call your doctor now or seek immediate medical care if:    You have new or worse swelling, redness, or pain in your face or around one or both of your eyes.     You have double vision or a change in your vision.     You have a high fever.     You have a severe headache and a stiff neck.     You have mental changes, such as feeling confused or much less alert.   Watch closely for changes in your health, and be sure to contact your doctor if:    You are not getting better as expected.   Where can you learn more?  Go to [email or web address]  Enter I933 in the search box to learn more about \"Acute Sinusitis: Care Instructions.\"  Current as of: October 27, 2024  Content Version: 14.4    0769-2647 [employer].   Care instructions adapted under license by your healthcare professional. If you have questions about a medical condition or this instruction, always ask your healthcare professional. [employer] disclaims any warranty or liability for your use of this information.    You may want to try a nasal lavage (also known as nasal irrigation). You can find over-the-counter products, such as Neti-Pot, at retail locations or make your own at home. Instructions for homemade nasal lavage and more information on the process are " available online at http://[email or web address].org/[email or web address]/[email or web address].html.    Dear Laura Guajardo    After reviewing your responses, I've been able to diagnose you with Acute sinusitis, recurrence not specified, unspecified location.      Based on your responses and diagnosis, I have prescribed   Orders Placed This Encounter   Medications     amoxicillin-clavulanate (AUGMENTIN) 875-125 MG tablet     Sig: Take 1 tablet by mouth 2 times daily for 7 days.     Dispense:  14 tablet     Refill:  0    to treat your symptoms. I have sent this to your pharmacy.?     It is also important to stay well hydrated, get lots of rest and take over-the-counter decongestants,?tylenol?or ibuprofen if you?are able to?take those medications per your primary care provider to help relieve discomfort.?     It is important that you take?all of?your prescribed medication even if your symptoms are improving after a few doses.? Taking?all of?your medicine helps prevent the symptoms from returning.?     If your symptoms worsen, you develop severe headache, vomiting, high fever (>102), or are not improving in 7 days, please contact your primary care provider for an appointment or visit any of our convenient Walk-in Care or Urgent Care Centers to be seen which can be found on our website?here.?     Thanks again for choosing?us?as your health care partner,?   ?  Osman Ochoa MD, MD?   Thank you for choosing us for your care. I have placed an order for a prescription so that you can start treatment:  Orders Placed This Encounter   Medications     amoxicillin-clavulanate (AUGMENTIN) 875-125 MG tablet     Sig: Take 1 tablet by mouth 2 times daily for 7 days.     Dispense:  14 tablet     Refill:  0        View your full visit summary for details by clicking on the link below. Your pharmacist will able to address any questions you may have about the medication.     If you're not feeling better within 5-7 days, please schedule an  appointment.  You can schedule an appointment right here in [facility], or call [phone]  If the visit is for the same symptoms as your eVisit, we'll refund the cost of your eVisit if seen within seven days.

## (undated) RX ORDER — GLYCOPYRROLATE 0.2 MG/ML
INJECTION INTRAMUSCULAR; INTRAVENOUS
Status: DISPENSED
Start: 2021-07-27

## (undated) RX ORDER — LIDOCAINE HYDROCHLORIDE 10 MG/ML
INJECTION, SOLUTION EPIDURAL; INFILTRATION; INTRACAUDAL; PERINEURAL
Status: DISPENSED
Start: 2021-07-27